# Patient Record
Sex: FEMALE | Race: WHITE | NOT HISPANIC OR LATINO | Employment: UNEMPLOYED | URBAN - METROPOLITAN AREA
[De-identification: names, ages, dates, MRNs, and addresses within clinical notes are randomized per-mention and may not be internally consistent; named-entity substitution may affect disease eponyms.]

---

## 2017-08-29 ENCOUNTER — HOSPITAL ENCOUNTER (OUTPATIENT)
Dept: RADIOLOGY | Facility: MEDICAL CENTER | Age: 62
End: 2017-08-29

## 2017-09-05 ENCOUNTER — HOSPITAL ENCOUNTER (OUTPATIENT)
Dept: LAB | Facility: MEDICAL CENTER | Age: 62
End: 2017-09-05
Attending: SURGERY
Payer: COMMERCIAL

## 2017-09-05 ENCOUNTER — HOSPITAL ENCOUNTER (OUTPATIENT)
Dept: RADIOLOGY | Facility: MEDICAL CENTER | Age: 62
End: 2017-09-05
Attending: SURGERY
Payer: COMMERCIAL

## 2017-09-05 DIAGNOSIS — C50.411 MALIGNANT NEOPLASM OF UPPER-OUTER QUADRANT OF RIGHT FEMALE BREAST (HCC): ICD-10-CM

## 2017-09-05 DIAGNOSIS — R92.0 BREAST MICROCALCIFICATIONS: ICD-10-CM

## 2017-09-05 LAB
BUN SERPL-MCNC: 15 MG/DL (ref 8–22)
CREAT SERPL-MCNC: 0.88 MG/DL (ref 0.5–1.4)
GFR SERPL CREATININE-BSD FRML MDRD: >60 ML/MIN/1.73 M 2

## 2017-09-05 PROCEDURE — 84520 ASSAY OF UREA NITROGEN: CPT

## 2017-09-05 PROCEDURE — 700117 HCHG RX CONTRAST REV CODE 255: Performed by: SURGERY

## 2017-09-05 PROCEDURE — A9579 GAD-BASE MR CONTRAST NOS,1ML: HCPCS | Performed by: SURGERY

## 2017-09-05 PROCEDURE — 36415 COLL VENOUS BLD VENIPUNCTURE: CPT

## 2017-09-05 PROCEDURE — C8908 MRI W/O FOL W/CONT, BREAST,: HCPCS

## 2017-09-05 PROCEDURE — 82565 ASSAY OF CREATININE: CPT

## 2017-09-05 RX ADMIN — GADODIAMIDE 15 ML: 287 INJECTION INTRAVENOUS at 17:51

## 2017-09-07 ENCOUNTER — TELEPHONE (OUTPATIENT)
Dept: RADIOLOGY | Facility: MEDICAL CENTER | Age: 62
End: 2017-09-07

## 2017-09-08 ENCOUNTER — TELEPHONE (OUTPATIENT)
Dept: RADIOLOGY | Facility: MEDICAL CENTER | Age: 62
End: 2017-09-08

## 2017-09-08 NOTE — TELEPHONE ENCOUNTER
09/08/17 - SP W/PT SAID WANTS TO DO US IN [facility].  SHE WILL ASK DOCTOR ERIN IF SHE CAN.  PT WILL CALL US BACK.

## 2017-09-19 ENCOUNTER — HOSPITAL ENCOUNTER (OUTPATIENT)
Dept: RADIOLOGY | Facility: MEDICAL CENTER | Age: 62
End: 2017-09-19
Attending: INTERNAL MEDICINE
Payer: COMMERCIAL

## 2017-09-19 ENCOUNTER — HOSPITAL ENCOUNTER (OUTPATIENT)
Dept: CARDIOLOGY | Facility: MEDICAL CENTER | Age: 62
End: 2017-09-19
Attending: INTERNAL MEDICINE
Payer: COMMERCIAL

## 2017-09-19 ENCOUNTER — HOSPITAL ENCOUNTER (OUTPATIENT)
Facility: MEDICAL CENTER | Age: 62
End: 2017-09-19
Attending: INTERNAL MEDICINE | Admitting: INTERNAL MEDICINE
Payer: COMMERCIAL

## 2017-09-19 VITALS
BODY MASS INDEX: 25.48 KG/M2 | OXYGEN SATURATION: 97 % | RESPIRATION RATE: 16 BRPM | DIASTOLIC BLOOD PRESSURE: 55 MMHG | WEIGHT: 172 LBS | SYSTOLIC BLOOD PRESSURE: 106 MMHG | HEIGHT: 69 IN | HEART RATE: 78 BPM

## 2017-09-19 DIAGNOSIS — C50.411 BREAST CANCER OF UPPER-OUTER QUADRANT OF RIGHT FEMALE BREAST (HCC): ICD-10-CM

## 2017-09-19 DIAGNOSIS — C50.411 MALIGNANT NEOPLASM OF UPPER-OUTER QUADRANT OF RIGHT FEMALE BREAST (HCC): ICD-10-CM

## 2017-09-19 LAB
LV EJECT FRACT  99904: 60
LV EJECT FRACT MOD 2C 99903: 48.42
LV EJECT FRACT MOD 4C 99902: 59.18
LV EJECT FRACT MOD BP 99901: 54.63

## 2017-09-19 PROCEDURE — 36561 INSERT TUNNELED CV CATH: CPT

## 2017-09-19 PROCEDURE — 93306 TTE W/DOPPLER COMPLETE: CPT | Mod: 26 | Performed by: INTERNAL MEDICINE

## 2017-09-19 PROCEDURE — 700101 HCHG RX REV CODE 250

## 2017-09-19 PROCEDURE — 700111 HCHG RX REV CODE 636 W/ 250 OVERRIDE (IP)

## 2017-09-19 PROCEDURE — 700111 HCHG RX REV CODE 636 W/ 250 OVERRIDE (IP): Performed by: RADIOLOGY

## 2017-09-19 PROCEDURE — 93306 TTE W/DOPPLER COMPLETE: CPT

## 2017-09-19 PROCEDURE — 700117 HCHG RX CONTRAST REV CODE 255: Performed by: INTERNAL MEDICINE

## 2017-09-19 PROCEDURE — 71260 CT THORAX DX C+: CPT

## 2017-09-19 RX ORDER — LIDOCAINE HYDROCHLORIDE 20 MG/ML
INJECTION, SOLUTION INFILTRATION; PERINEURAL
Status: COMPLETED
Start: 2017-09-19 | End: 2017-09-19

## 2017-09-19 RX ORDER — CEFAZOLIN SODIUM 1 G/3ML
INJECTION, POWDER, FOR SOLUTION INTRAMUSCULAR; INTRAVENOUS
Status: COMPLETED
Start: 2017-09-19 | End: 2017-09-19

## 2017-09-19 RX ORDER — EPINEPHRINE 1 MG/ML
INJECTION INTRAMUSCULAR; INTRAVENOUS; SUBCUTANEOUS
Status: COMPLETED
Start: 2017-09-19 | End: 2017-09-19

## 2017-09-19 RX ORDER — SODIUM CHLORIDE 9 MG/ML
500 INJECTION, SOLUTION INTRAVENOUS
Status: ACTIVE | OUTPATIENT
Start: 2017-09-19 | End: 2017-09-19

## 2017-09-19 RX ORDER — OXYCODONE HYDROCHLORIDE 5 MG/1
5 TABLET ORAL
Status: DISCONTINUED | OUTPATIENT
Start: 2017-09-19 | End: 2017-09-19 | Stop reason: HOSPADM

## 2017-09-19 RX ORDER — MIDAZOLAM HYDROCHLORIDE 1 MG/ML
.5-2 INJECTION INTRAMUSCULAR; INTRAVENOUS PRN
Status: ACTIVE | OUTPATIENT
Start: 2017-09-19 | End: 2017-09-19

## 2017-09-19 RX ORDER — ONDANSETRON 2 MG/ML
4 INJECTION INTRAMUSCULAR; INTRAVENOUS PRN
Status: ACTIVE | OUTPATIENT
Start: 2017-09-19 | End: 2017-09-19

## 2017-09-19 RX ORDER — SODIUM CHLORIDE 9 MG/ML
INJECTION, SOLUTION INTRAVENOUS
Status: DISCONTINUED | OUTPATIENT
Start: 2017-09-19 | End: 2017-09-19 | Stop reason: HOSPADM

## 2017-09-19 RX ORDER — OXYCODONE HYDROCHLORIDE 5 MG/1
2.5 TABLET ORAL
Status: DISCONTINUED | OUTPATIENT
Start: 2017-09-19 | End: 2017-09-19 | Stop reason: HOSPADM

## 2017-09-19 RX ADMIN — HEPARIN 500 UNITS: 100 SYRINGE at 09:13

## 2017-09-19 RX ADMIN — EPINEPHRINE 1 MG: 1 INJECTION INTRAMUSCULAR; INTRAVENOUS; SUBCUTANEOUS at 08:57

## 2017-09-19 RX ADMIN — IOHEXOL 100 ML: 350 INJECTION, SOLUTION INTRAVENOUS at 17:15

## 2017-09-19 RX ADMIN — LIDOCAINE HYDROCHLORIDE: 20 INJECTION, SOLUTION INFILTRATION; PERINEURAL at 08:00

## 2017-09-19 RX ADMIN — MIDAZOLAM HYDROCHLORIDE 1 MG: 1 INJECTION INTRAMUSCULAR; INTRAVENOUS at 08:48

## 2017-09-19 RX ADMIN — MIDAZOLAM HYDROCHLORIDE 2 MG: 1 INJECTION INTRAMUSCULAR; INTRAVENOUS at 08:38

## 2017-09-19 RX ADMIN — CEFAZOLIN 1000 MG: 1 INJECTION, POWDER, FOR SOLUTION INTRAVENOUS at 08:38

## 2017-09-19 ASSESSMENT — PAIN SCALES - GENERAL: PAINLEVEL_OUTOF10: 0

## 2017-09-19 NOTE — DISCHARGE INSTRUCTIONS
ACTIVITY: Rest and take it easy for the first 24 hours.  A responsible adult is recommended to remain with you during that time.  It is normal to feel sleepy.  We encourage you to not do anything that requires balance, judgment or coordination.    MILD FLU-LIKE SYMPTOMS ARE NORMAL. YOU MAY EXPERIENCE GENERALIZED MUSCLE ACHES, THROAT IRRITATION, HEADACHE AND/OR SOME NAUSEA.    FOR 24 HOURS DO NOT:  Drive, operate machinery or run household appliances.  Drink beer or alcoholic beverages.   Make important decisions or sign legal documents.    SPECIAL INSTRUCTIONS: keep dressing over surgical site until Thursday.      DIET: To avoid nausea, slowly advance diet as tolerated, avoiding spicy or greasy foods for the first day.  Add more substantial food to your diet according to your physician's instructions.  Babies can be fed formula or breast milk as soon as they are hungry.  INCREASE FLUIDS AND FIBER TO AVOID CONSTIPATION.    SURGICAL DRESSING/BATHING: Sponge bath on for 7 days, Do not get surgical area wet    FOLLOW-UP APPOINTMENT:  A follow-up appointment should be arranged with your doctor in 1 week; call to schedule.    You should CALL YOUR PHYSICIAN if you develop:  Fever greater than 101 degrees F.  Pain not relieved by medication, or persistent nausea or vomiting.  Excessive bleeding (blood soaking through dressing) or unexpected drainage from the wound.  Extreme redness or swelling around the incision site, drainage of pus or foul smelling drainage.  Inability to urinate or empty your bladder within 8 hours.  Problems with breathing or chest pain.    You should call 911 if you develop problems with breathing or chest pain.  If you are unable to contact your doctor or surgical center, you should go to the nearest emergency room or urgent care center.  Physician's telephone #: [phone]    If any questions arise, call your doctor.  If your doctor is not available, please feel free to call the Surgical Center at  [phone].  The Center is open Monday through Friday from 7AM to 7PM.  You can also call the HEALTH HOTLINE open 24 hours/day, 7 days/week and speak to a nurse at [phone], or toll free at [phone].    A registered nurse may call you a few days after your surgery to see how you are doing after your procedure.    MEDICATIONS: Resume taking daily medication.  Take prescribed pain medication with food.  If no medication is prescribed, you may take non-aspirin pain medication if needed.  PAIN MEDICATION CAN BE VERY CONSTIPATING.  Take a stool softener or laxative such as senokot, pericolace, or milk of magnesia if needed.    If your physician has prescribed pain medication that includes Acetaminophen (Tylenol), do not take additional Acetaminophen (Tylenol) while taking the prescribed medication.    Depression / Suicide Risk    As you are discharged from this [facility] facility, it is important to learn how to keep safe from harming yourself.    Recognize the warning signs:  · Abrupt changes in personality, positive or negative- including increase in energy   · Giving away possessions  · Change in eating patterns- significant weight changes-  positive or negative  · Change in sleeping patterns- unable to sleep or sleeping all the time   · Unwillingness or inability to communicate  · Depression  · Unusual sadness, discouragement and loneliness  · Talk of wanting to die  · Neglect of personal appearance   · Rebelliousness- reckless behavior  · Withdrawal from people/activities they love  · Confusion- inability to concentrate     If you or a loved one observes any of these behaviors or has concerns about self-harm, here's what you can do:  · Talk about it- your feelings and reasons for harming yourself  · Remove any means that you might use to hurt yourself (examples: pills, rope, extension cords, firearm)  · Get professional help from the community (Mental Health, Substance Abuse, psychological  counseling)  · Do not be alone:Call your Safe Contact- someone whom you trust who will be there for you.  · Call your local CRISIS HOTLINE 222-7388 or 942-186-7918  · Call your local Children's Mobile Crisis Response Team [place] (056) 974-2181 or www.[employer]  · Call the toll free National Suicide Prevention Hotlines   · National Suicide Prevention Lifeline 093-245-XLHM (8287)  · National Hope Line Network 800-SUICIDE (129-2705)  Implanted Port Insertion, Care After  Refer to this sheet in the next few weeks. These instructions provide you with information on caring for yourself after your procedure. Your health care provider may also give you more specific instructions. Your treatment has been planned according to current medical practices, but problems sometimes occur. Call your health care provider if you have any problems or questions after your procedure.  WHAT TO EXPECT AFTER THE PROCEDURE  After your procedure, it is typical to have the following:   Discomfort at the port insertion site. Ice packs to the area will help.  Bruising on the skin over the port. This will subside in 3-4 days.  HOME CARE INSTRUCTIONS  After your port is placed, you will get a 's information card. The card has information about your port. Keep this card with you at all times.    Know what kind of port you have. There are many types of ports available.    Wear a medical alert bracelet in case of an emergency. This can help alert health care workers that you have a port.    The port can stay in for as long as your health care provider believes it is necessary.    A home health care nurse may give medicines and take care of the port.    You or a family member can get special training and directions for giving medicine and taking care of the port at home.    SEEK MEDICAL CARE IF:   Your port does not flush or you are unable to get a blood return.    You have a fever or chills.  SEEK IMMEDIATE MEDICAL CARE  IF:  You have new fluid or pus coming from your incision.    You notice a bad smell coming from your incision site.    You have swelling, pain, or more redness at the incision or port site.    You have chest pain or shortness of breath.     This information is not intended to replace advice given to you by your health care provider. Make sure you discuss any questions you have with your health care provider.     Document Released: 10/08/2014 Document Revised: 12/23/2014 Document Reviewed: 10/08/2014  Elsevier Interactive Patient Education ©2016 Elsevier Inc.  ·

## 2017-09-19 NOTE — OR NURSING
Discharge instructions reviewed with patient and .  IV removed, tip intact.  Patient ambulated out with .

## 2017-09-19 NOTE — PROGRESS NOTES
OPIR Note    Patient underwent a port placement right IJ by Dr. Frias. Pt remained hemodynamically stable during procedure. Report given to Stan at bedside RN.  Port was heplocked with heparin.    [employer] powerport Ref # [id number] Lort # [id number]

## 2017-09-19 NOTE — OR SURGEON
Immediate Post- Operative Note        PostOp Diagnosis: Needs port for chemo      Procedure(s): Right IJ port placement      Estimated Blood Loss: Less than 5 ml        Complications: None            9/19/2017     9:08 AM     Renan Frias

## 2017-09-20 ENCOUNTER — HOSPITAL ENCOUNTER (OUTPATIENT)
Dept: RADIOLOGY | Facility: MEDICAL CENTER | Age: 62
End: 2017-09-20
Attending: INTERNAL MEDICINE
Payer: COMMERCIAL

## 2017-09-20 DIAGNOSIS — C50.411 MALIGNANT NEOPLASM OF UPPER-OUTER QUADRANT OF RIGHT FEMALE BREAST (HCC): ICD-10-CM

## 2017-09-20 PROCEDURE — A9503 TC99M MEDRONATE: HCPCS

## 2018-03-08 DIAGNOSIS — Z01.810 PRE-OPERATIVE CARDIOVASCULAR EXAMINATION: ICD-10-CM

## 2018-03-08 DIAGNOSIS — Z01.812 PRE-OPERATIVE LABORATORY EXAMINATION: ICD-10-CM

## 2018-03-08 LAB
ALBUMIN SERPL BCP-MCNC: 4 G/DL (ref 3.2–4.9)
ALBUMIN/GLOB SERPL: 1.5 G/DL
ALP SERPL-CCNC: 62 U/L (ref 30–99)
ALT SERPL-CCNC: 12 U/L (ref 2–50)
ANION GAP SERPL CALC-SCNC: 6 MMOL/L (ref 0–11.9)
AST SERPL-CCNC: 14 U/L (ref 12–45)
BASOPHILS # BLD AUTO: 0.6 % (ref 0–1.8)
BASOPHILS # BLD: 0.05 K/UL (ref 0–0.12)
BILIRUB SERPL-MCNC: 0.3 MG/DL (ref 0.1–1.5)
BUN SERPL-MCNC: 19 MG/DL (ref 8–22)
CALCIUM SERPL-MCNC: 9.4 MG/DL (ref 8.5–10.5)
CHLORIDE SERPL-SCNC: 108 MMOL/L (ref 96–112)
CO2 SERPL-SCNC: 26 MMOL/L (ref 20–33)
CREAT SERPL-MCNC: 0.88 MG/DL (ref 0.5–1.4)
EKG IMPRESSION: NORMAL
EOSINOPHIL # BLD AUTO: 0.07 K/UL (ref 0–0.51)
EOSINOPHIL NFR BLD: 0.9 % (ref 0–6.9)
ERYTHROCYTE [DISTWIDTH] IN BLOOD BY AUTOMATED COUNT: 41.3 FL (ref 35.9–50)
GLOBULIN SER CALC-MCNC: 2.6 G/DL (ref 1.9–3.5)
GLUCOSE SERPL-MCNC: 96 MG/DL (ref 65–99)
HCT VFR BLD AUTO: 39.8 % (ref 37–47)
HGB BLD-MCNC: 13.2 G/DL (ref 12–16)
IMM GRANULOCYTES # BLD AUTO: 0.04 K/UL (ref 0–0.11)
IMM GRANULOCYTES NFR BLD AUTO: 0.5 % (ref 0–0.9)
INR PPP: 1.08 (ref 0.87–1.13)
LYMPHOCYTES # BLD AUTO: 1.13 K/UL (ref 1–4.8)
LYMPHOCYTES NFR BLD: 14.3 % (ref 22–41)
MCH RBC QN AUTO: 31.1 PG (ref 27–33)
MCHC RBC AUTO-ENTMCNC: 33.2 G/DL (ref 33.6–35)
MCV RBC AUTO: 93.6 FL (ref 81.4–97.8)
MONOCYTES # BLD AUTO: 0.45 K/UL (ref 0–0.85)
MONOCYTES NFR BLD AUTO: 5.7 % (ref 0–13.4)
NEUTROPHILS # BLD AUTO: 6.16 K/UL (ref 2–7.15)
NEUTROPHILS NFR BLD: 78 % (ref 44–72)
NRBC # BLD AUTO: 0 K/UL
NRBC BLD-RTO: 0 /100 WBC
PLATELET # BLD AUTO: 238 K/UL (ref 164–446)
PMV BLD AUTO: 9.9 FL (ref 9–12.9)
POTASSIUM SERPL-SCNC: 3.9 MMOL/L (ref 3.6–5.5)
PROT SERPL-MCNC: 6.6 G/DL (ref 6–8.2)
PROTHROMBIN TIME: 13.7 SEC (ref 12–14.6)
RBC # BLD AUTO: 4.25 M/UL (ref 4.2–5.4)
SODIUM SERPL-SCNC: 140 MMOL/L (ref 135–145)
WBC # BLD AUTO: 7.9 K/UL (ref 4.8–10.8)

## 2018-03-08 PROCEDURE — 93005 ELECTROCARDIOGRAM TRACING: CPT

## 2018-03-08 PROCEDURE — 85610 PROTHROMBIN TIME: CPT

## 2018-03-08 PROCEDURE — 93010 ELECTROCARDIOGRAM REPORT: CPT | Performed by: INTERNAL MEDICINE

## 2018-03-08 PROCEDURE — 36415 COLL VENOUS BLD VENIPUNCTURE: CPT

## 2018-03-08 PROCEDURE — 80053 COMPREHEN METABOLIC PANEL: CPT

## 2018-03-08 PROCEDURE — 85025 COMPLETE CBC W/AUTO DIFF WBC: CPT

## 2018-03-10 ENCOUNTER — HOSPITAL ENCOUNTER (OUTPATIENT)
Facility: MEDICAL CENTER | Age: 63
End: 2018-03-11
Attending: PLASTIC SURGERY | Admitting: PLASTIC SURGERY
Payer: COMMERCIAL

## 2018-03-10 PROCEDURE — 88307 TISSUE EXAM BY PATHOLOGIST: CPT

## 2018-03-10 PROCEDURE — 502240 HCHG MISC OR SUPPLY RC 0272: Performed by: PLASTIC SURGERY

## 2018-03-10 PROCEDURE — 160035 HCHG PACU - 1ST 60 MINS PHASE I: Performed by: PLASTIC SURGERY

## 2018-03-10 PROCEDURE — 500368 HCHG DRAIN, 7MM FLAT-FLUTED: Performed by: PLASTIC SURGERY

## 2018-03-10 PROCEDURE — 160009 HCHG ANES TIME/MIN: Performed by: PLASTIC SURGERY

## 2018-03-10 PROCEDURE — 96374 THER/PROPH/DIAG INJ IV PUSH: CPT

## 2018-03-10 PROCEDURE — 88309 TISSUE EXAM BY PATHOLOGIST: CPT

## 2018-03-10 PROCEDURE — A6402 STERILE GAUZE <= 16 SQ IN: HCPCS | Performed by: PLASTIC SURGERY

## 2018-03-10 PROCEDURE — 160036 HCHG PACU - EA ADDL 30 MINS PHASE I: Performed by: PLASTIC SURGERY

## 2018-03-10 PROCEDURE — A9270 NON-COVERED ITEM OR SERVICE: HCPCS | Performed by: SURGERY

## 2018-03-10 PROCEDURE — A9270 NON-COVERED ITEM OR SERVICE: HCPCS

## 2018-03-10 PROCEDURE — G0378 HOSPITAL OBSERVATION PER HR: HCPCS

## 2018-03-10 PROCEDURE — 700112 HCHG RX REV CODE 229: Performed by: SURGERY

## 2018-03-10 PROCEDURE — 110372 HCHG SHELL REV 278: Performed by: PLASTIC SURGERY

## 2018-03-10 PROCEDURE — 700111 HCHG RX REV CODE 636 W/ 250 OVERRIDE (IP)

## 2018-03-10 PROCEDURE — 700101 HCHG RX REV CODE 250

## 2018-03-10 PROCEDURE — 502000 HCHG MISC OR IMPLANTS RC 0278: Performed by: PLASTIC SURGERY

## 2018-03-10 PROCEDURE — 500423 HCHG DRESSING, ABD COMBINE: Performed by: PLASTIC SURGERY

## 2018-03-10 PROCEDURE — 700101 HCHG RX REV CODE 250: Performed by: SURGERY

## 2018-03-10 PROCEDURE — 501497 HCHG SURGICLIP: Performed by: PLASTIC SURGERY

## 2018-03-10 PROCEDURE — 700111 HCHG RX REV CODE 636 W/ 250 OVERRIDE (IP): Performed by: SURGERY

## 2018-03-10 PROCEDURE — 160029 HCHG SURGERY MINUTES - 1ST 30 MINS LEVEL 4: Performed by: PLASTIC SURGERY

## 2018-03-10 PROCEDURE — 500438 HCHG DRESSING, SPANDAGE #10 12: Performed by: PLASTIC SURGERY

## 2018-03-10 PROCEDURE — 502703 HCHG DEVICE, LIGASURE V SEALER: Performed by: PLASTIC SURGERY

## 2018-03-10 PROCEDURE — 500378 HCHG DRAIN, J-VAC ROUND 19FR: Performed by: PLASTIC SURGERY

## 2018-03-10 PROCEDURE — 500122 HCHG BOVIE, BLADE: Performed by: PLASTIC SURGERY

## 2018-03-10 PROCEDURE — 500126 HCHG BOVIE, NEEDLE TIP: Performed by: PLASTIC SURGERY

## 2018-03-10 PROCEDURE — 160048 HCHG OR STATISTICAL LEVEL 1-5: Performed by: PLASTIC SURGERY

## 2018-03-10 PROCEDURE — 501838 HCHG SUTURE GENERAL: Performed by: PLASTIC SURGERY

## 2018-03-10 PROCEDURE — 501445 HCHG STAPLER, SKIN DISP: Performed by: PLASTIC SURGERY

## 2018-03-10 PROCEDURE — A6403 STERILE GAUZE>16 <= 48 SQ IN: HCPCS | Performed by: PLASTIC SURGERY

## 2018-03-10 PROCEDURE — 500389 HCHG DRAIN, RESERVOIR SUCT JP 100CC: Performed by: PLASTIC SURGERY

## 2018-03-10 PROCEDURE — 500054 HCHG BANDAGE, ELASTIC 6: Performed by: PLASTIC SURGERY

## 2018-03-10 PROCEDURE — 160041 HCHG SURGERY MINUTES - EA ADDL 1 MIN LEVEL 4: Performed by: PLASTIC SURGERY

## 2018-03-10 PROCEDURE — 700102 HCHG RX REV CODE 250 W/ 637 OVERRIDE(OP): Performed by: SURGERY

## 2018-03-10 PROCEDURE — 160002 HCHG RECOVERY MINUTES (STAT): Performed by: PLASTIC SURGERY

## 2018-03-10 PROCEDURE — 700102 HCHG RX REV CODE 250 W/ 637 OVERRIDE(OP)

## 2018-03-10 DEVICE — IMPLANTABLE DEVICE: Type: IMPLANTABLE DEVICE | Site: BREAST | Status: FUNCTIONAL

## 2018-03-10 RX ORDER — LIDOCAINE AND PRILOCAINE 25; 25 MG/G; MG/G
1 CREAM TOPICAL
Status: ACTIVE | OUTPATIENT
Start: 2018-03-10 | End: 2018-03-11

## 2018-03-10 RX ORDER — LABETALOL HYDROCHLORIDE 5 MG/ML
10 INJECTION, SOLUTION INTRAVENOUS EVERY 4 HOURS PRN
Status: DISCONTINUED | OUTPATIENT
Start: 2018-03-10 | End: 2018-03-11 | Stop reason: HOSPADM

## 2018-03-10 RX ORDER — DOCUSATE SODIUM 100 MG/1
100 CAPSULE, LIQUID FILLED ORAL 2 TIMES DAILY
Status: DISCONTINUED | OUTPATIENT
Start: 2018-03-10 | End: 2018-03-11 | Stop reason: HOSPADM

## 2018-03-10 RX ORDER — LORAZEPAM 2 MG/ML
0.5 INJECTION INTRAMUSCULAR EVERY 4 HOURS PRN
Status: DISCONTINUED | OUTPATIENT
Start: 2018-03-10 | End: 2018-03-11 | Stop reason: HOSPADM

## 2018-03-10 RX ORDER — SODIUM CHLORIDE, SODIUM LACTATE, POTASSIUM CHLORIDE, CALCIUM CHLORIDE 600; 310; 30; 20 MG/100ML; MG/100ML; MG/100ML; MG/100ML
INJECTION, SOLUTION INTRAVENOUS CONTINUOUS
Status: DISCONTINUED | OUTPATIENT
Start: 2018-03-10 | End: 2018-03-11 | Stop reason: HOSPADM

## 2018-03-10 RX ORDER — BISACODYL 10 MG
10 SUPPOSITORY, RECTAL RECTAL
Status: DISCONTINUED | OUTPATIENT
Start: 2018-03-10 | End: 2018-03-11 | Stop reason: HOSPADM

## 2018-03-10 RX ORDER — ENEMA 19; 7 G/133ML; G/133ML
1 ENEMA RECTAL
Status: DISCONTINUED | OUTPATIENT
Start: 2018-03-10 | End: 2018-03-11 | Stop reason: HOSPADM

## 2018-03-10 RX ORDER — LIDOCAINE HYDROCHLORIDE 10 MG/ML
INJECTION, SOLUTION INFILTRATION; PERINEURAL
Status: COMPLETED
Start: 2018-03-10 | End: 2018-03-10

## 2018-03-10 RX ORDER — MIDAZOLAM HYDROCHLORIDE 1 MG/ML
INJECTION INTRAMUSCULAR; INTRAVENOUS
Status: COMPLETED
Start: 2018-03-10 | End: 2018-03-10

## 2018-03-10 RX ORDER — BUPIVACAINE HYDROCHLORIDE AND EPINEPHRINE 5; 5 MG/ML; UG/ML
INJECTION, SOLUTION EPIDURAL; INTRACAUDAL; PERINEURAL
Status: DISCONTINUED | OUTPATIENT
Start: 2018-03-10 | End: 2018-03-10 | Stop reason: HOSPADM

## 2018-03-10 RX ORDER — LIDOCAINE HYDROCHLORIDE 10 MG/ML
0.5 INJECTION, SOLUTION INFILTRATION; PERINEURAL
Status: ACTIVE | OUTPATIENT
Start: 2018-03-10 | End: 2018-03-11

## 2018-03-10 RX ORDER — ACETAMINOPHEN 650 MG/1
650 SUPPOSITORY RECTAL EVERY 4 HOURS PRN
Status: DISCONTINUED | OUTPATIENT
Start: 2018-03-10 | End: 2018-03-11 | Stop reason: HOSPADM

## 2018-03-10 RX ORDER — AMOXICILLIN 250 MG
1 CAPSULE ORAL NIGHTLY
Status: DISCONTINUED | OUTPATIENT
Start: 2018-03-10 | End: 2018-03-11 | Stop reason: HOSPADM

## 2018-03-10 RX ORDER — MORPHINE SULFATE 4 MG/ML
1-4 INJECTION, SOLUTION INTRAMUSCULAR; INTRAVENOUS
Status: DISCONTINUED | OUTPATIENT
Start: 2018-03-10 | End: 2018-03-11 | Stop reason: HOSPADM

## 2018-03-10 RX ORDER — OXYCODONE HCL 5 MG/5 ML
SOLUTION, ORAL ORAL
Status: COMPLETED
Start: 2018-03-10 | End: 2018-03-10

## 2018-03-10 RX ORDER — AMOXICILLIN 250 MG
1 CAPSULE ORAL
Status: DISCONTINUED | OUTPATIENT
Start: 2018-03-10 | End: 2018-03-11 | Stop reason: HOSPADM

## 2018-03-10 RX ORDER — ACETAMINOPHEN 325 MG/1
650 TABLET ORAL EVERY 4 HOURS PRN
Status: DISCONTINUED | OUTPATIENT
Start: 2018-03-10 | End: 2018-03-11 | Stop reason: HOSPADM

## 2018-03-10 RX ORDER — MORPHINE SULFATE 4 MG/ML
1-5 INJECTION, SOLUTION INTRAMUSCULAR; INTRAVENOUS
Status: DISCONTINUED | OUTPATIENT
Start: 2018-03-10 | End: 2018-03-10

## 2018-03-10 RX ORDER — OXYCODONE HYDROCHLORIDE 5 MG/1
5 TABLET ORAL
Status: DISCONTINUED | OUTPATIENT
Start: 2018-03-10 | End: 2018-03-11 | Stop reason: HOSPADM

## 2018-03-10 RX ORDER — ACETAMINOPHEN 650 MG
TABLET, EXTENDED RELEASE ORAL
Status: DISCONTINUED | OUTPATIENT
Start: 2018-03-10 | End: 2018-03-10 | Stop reason: HOSPADM

## 2018-03-10 RX ORDER — DEXTROSE MONOHYDRATE, SODIUM CHLORIDE, AND POTASSIUM CHLORIDE 50; 1.49; 4.5 G/1000ML; G/1000ML; G/1000ML
INJECTION, SOLUTION INTRAVENOUS CONTINUOUS
Status: DISCONTINUED | OUTPATIENT
Start: 2018-03-10 | End: 2018-03-11 | Stop reason: HOSPADM

## 2018-03-10 RX ORDER — CEFAZOLIN SODIUM 2 G/100ML
2 INJECTION, SOLUTION INTRAVENOUS EVERY 8 HOURS
Status: DISCONTINUED | OUTPATIENT
Start: 2018-03-10 | End: 2018-03-11 | Stop reason: HOSPADM

## 2018-03-10 RX ORDER — MORPHINE SULFATE 10 MG/ML
5 INJECTION, SOLUTION INTRAMUSCULAR; INTRAVENOUS
Status: DISCONTINUED | OUTPATIENT
Start: 2018-03-10 | End: 2018-03-11 | Stop reason: HOSPADM

## 2018-03-10 RX ORDER — FAMOTIDINE 20 MG/1
20 TABLET, FILM COATED ORAL 2 TIMES DAILY
Status: DISCONTINUED | OUTPATIENT
Start: 2018-03-10 | End: 2018-03-11 | Stop reason: HOSPADM

## 2018-03-10 RX ORDER — ONDANSETRON 2 MG/ML
4 INJECTION INTRAMUSCULAR; INTRAVENOUS EVERY 4 HOURS PRN
Status: DISCONTINUED | OUTPATIENT
Start: 2018-03-10 | End: 2018-03-11 | Stop reason: HOSPADM

## 2018-03-10 RX ORDER — POLYETHYLENE GLYCOL 3350 17 G/17G
1 POWDER, FOR SOLUTION ORAL 2 TIMES DAILY
Status: DISCONTINUED | OUTPATIENT
Start: 2018-03-10 | End: 2018-03-11 | Stop reason: HOSPADM

## 2018-03-10 RX ADMIN — CEFAZOLIN SODIUM 2 G: 2 INJECTION, SOLUTION INTRAVENOUS at 21:24

## 2018-03-10 RX ADMIN — SODIUM CHLORIDE, POTASSIUM CHLORIDE, SODIUM LACTATE AND CALCIUM CHLORIDE: 600; 310; 30; 20 INJECTION, SOLUTION INTRAVENOUS at 12:08

## 2018-03-10 RX ADMIN — HYDROMORPHONE HYDROCHLORIDE 0.3 MG: 10 INJECTION, SOLUTION INTRAMUSCULAR; INTRAVENOUS; SUBCUTANEOUS at 16:40

## 2018-03-10 RX ADMIN — FENTANYL CITRATE 50 MCG: 50 INJECTION, SOLUTION INTRAMUSCULAR; INTRAVENOUS at 15:34

## 2018-03-10 RX ADMIN — LIDOCAINE HYDROCHLORIDE: 10 INJECTION, SOLUTION INFILTRATION; PERINEURAL at 12:08

## 2018-03-10 RX ADMIN — STANDARDIZED SENNA CONCENTRATE AND DOCUSATE SODIUM 1 TABLET: 8.6; 5 TABLET, FILM COATED ORAL at 21:15

## 2018-03-10 RX ADMIN — MAGNESIUM HYDROXIDE 30 ML: 400 SUSPENSION ORAL at 21:17

## 2018-03-10 RX ADMIN — ACETAMINOPHEN 650 MG: 325 TABLET, FILM COATED ORAL at 21:23

## 2018-03-10 RX ADMIN — POTASSIUM CHLORIDE, DEXTROSE MONOHYDRATE AND SODIUM CHLORIDE: 150; 5; 450 INJECTION, SOLUTION INTRAVENOUS at 18:48

## 2018-03-10 RX ADMIN — POLYETHYLENE GLYCOL 3350 1 PACKET: 17 POWDER, FOR SOLUTION ORAL at 21:16

## 2018-03-10 RX ADMIN — OXYCODONE HYDROCHLORIDE 10 MG: 5 SOLUTION ORAL at 15:34

## 2018-03-10 RX ADMIN — FAMOTIDINE 20 MG: 20 TABLET, FILM COATED ORAL at 21:16

## 2018-03-10 RX ADMIN — OXYCODONE HYDROCHLORIDE 5 MG: 5 TABLET ORAL at 18:47

## 2018-03-10 RX ADMIN — FENTANYL CITRATE 50 MCG: 50 INJECTION, SOLUTION INTRAMUSCULAR; INTRAVENOUS at 15:52

## 2018-03-10 RX ADMIN — MIDAZOLAM 0.5 MG: 1 INJECTION INTRAMUSCULAR; INTRAVENOUS at 15:45

## 2018-03-10 RX ADMIN — DOCUSATE SODIUM 100 MG: 100 CAPSULE ORAL at 18:48

## 2018-03-10 ASSESSMENT — PAIN SCALES - GENERAL
PAINLEVEL_OUTOF10: 8
PAINLEVEL_OUTOF10: 4
PAINLEVEL_OUTOF10: 6
PAINLEVEL_OUTOF10: 6
PAINLEVEL_OUTOF10: 5
PAINLEVEL_OUTOF10: 7
PAINLEVEL_OUTOF10: 0
PAINLEVEL_OUTOF10: 7
PAINLEVEL_OUTOF10: 2
PAINLEVEL_OUTOF10: 6
PAINLEVEL_OUTOF10: 3

## 2018-03-10 ASSESSMENT — LIFESTYLE VARIABLES
HAVE YOU EVER FELT YOU SHOULD CUT DOWN ON YOUR DRINKING: NO
ON A TYPICAL DAY WHEN YOU DRINK ALCOHOL HOW MANY DRINKS DO YOU HAVE: 1
CONSUMPTION TOTAL: NEGATIVE
TOTAL SCORE: 0
EVER FELT BAD OR GUILTY ABOUT YOUR DRINKING: NO
HOW MANY TIMES IN THE PAST YEAR HAVE YOU HAD 5 OR MORE DRINKS IN A DAY: 0
PACK_YEARS: 25
TOTAL SCORE: 0
EVER HAD A DRINK FIRST THING IN THE MORNING TO STEADY YOUR NERVES TO GET RID OF A HANGOVER: NO
TOTAL SCORE: 0
DO YOU DRINK ALCOHOL: YES
HAVE PEOPLE ANNOYED YOU BY CRITICIZING YOUR DRINKING: NO
AVERAGE NUMBER OF DAYS PER WEEK YOU HAVE A DRINK CONTAINING ALCOHOL: 2
EVER_SMOKED: YES

## 2018-03-10 ASSESSMENT — PATIENT HEALTH QUESTIONNAIRE - PHQ9
SUM OF ALL RESPONSES TO PHQ QUESTIONS 1-9: 0
1. LITTLE INTEREST OR PLEASURE IN DOING THINGS: NOT AT ALL
SUM OF ALL RESPONSES TO PHQ9 QUESTIONS 1 AND 2: 0
2. FEELING DOWN, DEPRESSED, IRRITABLE, OR HOPELESS: NOT AT ALL

## 2018-03-10 NOTE — OR SURGEON
Immediate Post OP Note    PreOp Diagnosis: Right Breast Cancer s/p neoadjuvant therapy    PostOp Diagnosis: same    Procedure(s):  BREAST RECONSTRUCTION - Wound Class: Clean  TISSUE EXPANDER PLACE/REMOVE- & USE OF ACELLULAR DERMAL MATRIX - Wound Class: Clean  Right MASTECTOMY-TOTAL with limited node dissection - Wound Class: Clean  NODE BIOPSY SENTINEL- AXILLARY - Wound Class: Clean   Left total mastectomy    Surgeon(s):  NEMO Brannon M.D.    Anesthesiologist/Type of Anesthesia:  Anesthesiologist: Munir Choudhary M.D./General    Surgical Staff:  Assistant: Kamila Allen  Circulator: Diogenes Resendez R.N.  Relief Circulator: Katlyn Harry R.N.  Scrub Person: Scotty Valles    Specimens:  * No specimens in log *    Estimated Blood Loss: 50 cc's    Findings: no gross pathology in breast, sentinel nodes were firm and there were some enlarged nodes so limited node dissection performed.     Complications: no apparent complicatons        3/10/2018 2:58 PM Jung Meadows

## 2018-03-10 NOTE — OP REPORT
DATE OF OPERATION: 3/10/2018    PREOPERATIVE DIAGNOSIS: right breast cancer    POSTOPERATIVE DIAGNOSIS: Same    PROCEDURE PERFORMED: Bilateral tissue expanders with use of acellular dermal matrix.    SURGEON:German Shetty M.D., MD    ASSISTANT: Kamila TARIQ     ANESTHESIOLOGIST:  Jakub Choudhary MD    ANESTHESIA: general    INDICATIONS: The patient is a 62 y.o. female with right breast cancer who has elected to have bilateral mastectomies and immediate reconstruction with placement of tissue expanders and use of acellular dermal matrix.      FINDINGS:  375 Tissue expanders were placed bilaterally.  240 ml was placed in the right tissue expander and 240 ml was placed in the left tissue expander.    SPECIMEN: None for my portion of the operation    ESTIMATED BLOOD LOSS: Minimal    PROCEDURE: After the operative, nonoperative, and alternatives were discussed which included but was not limited to bleeding, infection, damage to surrounding structures, need for further surgery, reaction in the anesthetic agent, scarring, mastectomy skin flap necrosis, wound healing difficulties, need for tissue expander removal, tissue expander rupture, failed breast reconstruction, asymmetry, contour irregularities, deep venous thrombosis, pulmonary embolus, myocardial infarction, stroke, unsatisfactory result, and/or death informed consent was obtained. Preoperative the patient was identified and in a sitting upright position the patient's sternal notch, midline, and inframammary folds were marked. The plan mastectomy incision lines were drawn out in a horizontal fashion. Antibiotics were given and sequential compression devices were placed. Patient was brought back to the operating room where general anesthesia was induced. Please see the operative note from the general surgeon for the mastectomy portion of the operation. Following completion of this starting on the left side bovie electrocautery was used to dissect down along  the lateral border of the pectoralis major muscle. A plane was then developed between the pectoralis major and the pectoralis minor to about 2 cm off the midline, superiorly the pocket was developed, inferiorly the dissection was then carried down to the inframammary fold. The muscle was then released and the lateral to medial direction along its origin. The chest wall was measured and marked out. Acellular dermal matrix was then sewn into the inframammary fold and lateral chest wall with interrupted 2-0 Vicryl sutures. Intercostal blocks were placed for postoperative pain control. 2 #7 flat drains were placed and secured with 3-0 nylon sutures. The pocket was then copiously irrigated with a triple antibiotic irrigation. Then using new gloves, Betadine on the skin, and a minimal touch technique a 375 ml  tissue expander was placed. The acellular dermal matrix was then approximated to the pectoralis major muscle with running and interrupted 2-0 Vicryl sutures. The tissue expander was then filled with 240 ml of solution. The overlying skin was then closed with 3-0 deep dermal Monocryl sutures and running 4-0 Monocryl subcuticular stitches.     We then turned our attention to the contralateral side with the same procedure was performed. The same size tissue expander was placed.  240 ml of solution was placed in this tissue expander. The wound was then closed in similar fashion. Steri-Strips and compressive dressings were then placed.    The patient tolerated the procedure well. At the end of the procedure all sponge, instruments, and needle counts were correct. There were no apparent complications.    Cc: German Shetty M.D., Jung Meadows MD  ____________________________________     German Shetty M.D.  , MD    DT: 3/10/2018  3:17 PM

## 2018-03-11 VITALS
HEIGHT: 69 IN | WEIGHT: 161.6 LBS | TEMPERATURE: 98.2 F | BODY MASS INDEX: 23.93 KG/M2 | DIASTOLIC BLOOD PRESSURE: 71 MMHG | RESPIRATION RATE: 17 BRPM | OXYGEN SATURATION: 99 % | SYSTOLIC BLOOD PRESSURE: 140 MMHG | HEART RATE: 62 BPM

## 2018-03-11 PROCEDURE — 700111 HCHG RX REV CODE 636 W/ 250 OVERRIDE (IP): Performed by: SURGERY

## 2018-03-11 PROCEDURE — 90670 PCV13 VACCINE IM: CPT | Performed by: PLASTIC SURGERY

## 2018-03-11 PROCEDURE — G0378 HOSPITAL OBSERVATION PER HR: HCPCS

## 2018-03-11 PROCEDURE — 90471 IMMUNIZATION ADMIN: CPT

## 2018-03-11 PROCEDURE — A9270 NON-COVERED ITEM OR SERVICE: HCPCS | Performed by: SURGERY

## 2018-03-11 PROCEDURE — 90686 IIV4 VACC NO PRSV 0.5 ML IM: CPT | Performed by: PLASTIC SURGERY

## 2018-03-11 PROCEDURE — 96375 TX/PRO/DX INJ NEW DRUG ADDON: CPT

## 2018-03-11 PROCEDURE — 96376 TX/PRO/DX INJ SAME DRUG ADON: CPT

## 2018-03-11 PROCEDURE — 700105 HCHG RX REV CODE 258: Performed by: PLASTIC SURGERY

## 2018-03-11 PROCEDURE — 700111 HCHG RX REV CODE 636 W/ 250 OVERRIDE (IP): Performed by: PLASTIC SURGERY

## 2018-03-11 PROCEDURE — 700102 HCHG RX REV CODE 250 W/ 637 OVERRIDE(OP): Performed by: SURGERY

## 2018-03-11 PROCEDURE — 700112 HCHG RX REV CODE 229: Performed by: SURGERY

## 2018-03-11 RX ADMIN — PNEUMOCOCCAL 13-VALENT CONJUGATE VACCINE 0.5 ML: 2.2; 2.2; 2.2; 2.2; 2.2; 4.4; 2.2; 2.2; 2.2; 2.2; 2.2; 2.2; 2.2 INJECTION, SUSPENSION INTRAMUSCULAR at 12:12

## 2018-03-11 RX ADMIN — POLYETHYLENE GLYCOL 3350 1 PACKET: 17 POWDER, FOR SOLUTION ORAL at 09:00

## 2018-03-11 RX ADMIN — OXYCODONE HYDROCHLORIDE 5 MG: 5 TABLET ORAL at 08:38

## 2018-03-11 RX ADMIN — SODIUM CHLORIDE, POTASSIUM CHLORIDE, SODIUM LACTATE AND CALCIUM CHLORIDE: 600; 310; 30; 20 INJECTION, SOLUTION INTRAVENOUS at 05:00

## 2018-03-11 RX ADMIN — FAMOTIDINE 20 MG: 20 TABLET, FILM COATED ORAL at 08:38

## 2018-03-11 RX ADMIN — CEFAZOLIN SODIUM 2 G: 2 INJECTION, SOLUTION INTRAVENOUS at 05:00

## 2018-03-11 RX ADMIN — INFLUENZA A VIRUS A/MICHIGAN/45/2015 X-275 (H1N1) ANTIGEN (FORMALDEHYDE INACTIVATED), INFLUENZA A VIRUS A/HONG KONG/4801/2014 X-263B (H3N2) ANTIGEN (FORMALDEHYDE INACTIVATED), INFLUENZA B VIRUS B/PHUKET/3073/2013 ANTIGEN (FORMALDEHYDE INACTIVATED), AND INFLUENZA B VIRUS B/BRISBANE/60/2008 ANTIGEN (FORMALDEHYDE INACTIVATED) 0.5 ML: 15; 15; 15; 15 INJECTION, SUSPENSION INTRAMUSCULAR at 08:38

## 2018-03-11 RX ADMIN — OXYCODONE HYDROCHLORIDE 5 MG: 5 TABLET ORAL at 00:18

## 2018-03-11 RX ADMIN — DOCUSATE SODIUM 100 MG: 100 CAPSULE ORAL at 08:38

## 2018-03-11 RX ADMIN — ONDANSETRON HYDROCHLORIDE 4 MG: 2 INJECTION, SOLUTION INTRAMUSCULAR; INTRAVENOUS at 05:07

## 2018-03-11 ASSESSMENT — PAIN SCALES - GENERAL
PAINLEVEL_OUTOF10: 3
PAINLEVEL_OUTOF10: 3
PAINLEVEL_OUTOF10: 5
PAINLEVEL_OUTOF10: 6

## 2018-03-11 ASSESSMENT — ENCOUNTER SYMPTOMS
COUGH: 0
VOMITING: 0
CHILLS: 0
FEVER: 0
NAUSEA: 0

## 2018-03-11 NOTE — PROGRESS NOTES
"Trauma / Surgical Progress Note  Interval Events:  24 hour interval history reviewed.  Patient is doing well with pain well managed.  DON drainage has been moderate.  One episode of N/V after surgery but better now.       Review of Systems   Constitutional: Negative for chills and fever.   Respiratory: Negative for cough.    Gastrointestinal: Negative for nausea and vomiting.       Vitals:  Blood pressure 126/55, pulse 60, temperature 36.1 °C (97 °F), resp. rate 17, height 1.753 m (5' 9\"), weight 73.3 kg (161 lb 9.6 oz), SpO2 95 %, not currently breastfeeding.  Temp (24hrs), Av.5 °C (97.7 °F), Min:36.1 °C (97 °F), Max:36.9 °C (98.5 °F)      IO:       Exam:  Respiratory: unlabored respirations, clear to A  Chest:  Flaps appear viable, DON drains mostly serosanguinous  Neuro: no focal deficits noted  Cardiovascular: regular rate and rhythm  GI: abdomen is soft and non-tender  Other: General: alert    Labs:  No results found for this or any previous visit (from the past 24 hour(s)).    Problem and Plan:  There are no active hospital problems to display for this patient.      Core Measures & Quality Metrics:  Labs reviewed and Medications reviewed  Shaikh catheter: No Shaikh      DVT Prophylaxis: Enoxaparin (Lovenox)  DVT prophylaxis - mechanical: SCDs  Ulcer prophylaxis: Yes      Imp: POD #1  S/p right total mastectomy, sentinel node and axillary node evaluation for breast cancer; s/p left total mastectomy with immediate bilateral breast reconstruction  Plan:  Teach DON drain management  Discharge to care of family  F/U with Dr. Shetty in one week and Dr. Meadows in 2 weeks  Pathology will be called to patient.  "

## 2018-03-11 NOTE — DISCHARGE INSTRUCTIONS
Discharge Instructions          Patient to see Dr. Shetty in one week.  She will see Dr. Meadows in 2 weeks but he will call with path results.  No showers til seen by Dr. Shetty in one week.   Send home with ABD pads to change dressings as needed.    Bulb Drain Home Care  A bulb drain consists of a thin rubber tube and a soft, round bulb that creates a gentle suction. The rubber tube is placed in the area where you had surgery. A bulb is attached to the end of the tube that is outside the body. The bulb drain removes excess fluid that normally builds up in a surgical wound after surgery. The color and amount of fluid will vary. Immediately after surgery, the fluid is bright red and is a little thicker than water. It may gradually change to a yellow or pink color and become more thin and water-like. When the amount decreases to about 1 or 2 tbsp in 24 hours, your health care provider will usually remove it.  DAILY CARE  · Keep the bulb flat (compressed) at all times, except while emptying it. The flatness creates suction. You can flatten the bulb by squeezing it firmly in the middle and then closing the cap.  · Keep sites where the tube enters the skin dry and covered with a bandage (dressing).  · Secure the tube 1-2 in (2.5-5.1 cm) below the insertion sites to keep it from pulling on your stitches. The tube is stitched in place and will not slip out.  · Secure the bulb as directed by your health care provider.  · For the first 3 days after surgery, there usually is more fluid in the bulb. Empty the bulb whenever it becomes half full because the bulb does not create enough suction if it is too full. The bulb could also overflow. Write down how much fluid you remove each time you empty your drain. Add up the amount removed in 24 hours.  · Empty the bulb at the same time every day once the amount of fluid decreases and you only need to empty it once a day. Write down the amounts and the 24-hour totals to give to your  health care provider. This helps your health care provider know when the tubes can be removed.  EMPTYING THE BULB DRAIN  Before emptying the bulb, get a measuring cup, a piece of paper and a pen, and wash your hands.  · Gently run your fingers down the tube (stripping) to empty any drainage from the tubing into the bulb. This may need to be done several times a day to clear the tubing of clots and tissue.  · Open the bulb cap to release suction, which causes it to inflate. Do not touch the inside of the cap.  · Gently run your fingers down the tube (stripping) to empty any drainage from the tubing into the bulb.  · Hold the cap out of the way, and pour fluid into the measuring cup.    · Squeeze the bulb to provide suction.   · Replace the cap.    · Check the tape that holds the tube to your skin. If it is becoming loose, you can remove the loose piece of tape and apply a new one. Then, pin the bulb to your shirt.    · Write down the amount of fluid you emptied out. Write down the date and each time you emptied your bulb drain. (If there are 2 bulbs, note the amount of drainage from each bulb and keep the totals separate. Your health care provider will want to know the total amounts for each drain and which tube is draining more.)    · Flush the fluid down the toilet and wash your hands.    · Call your health care provider once you have less than 2 tbsp of fluid collecting in the bulb drain every 24 hours.  If there is drainage around the tube site, change dressings and keep the area dry. Cleanse around tube with sterile saline and place dry gauze around site. This gauze should be changed when it is soiled. If it stays clean and unsoiled, it should still be changed daily.   SEEK MEDICAL CARE IF:  · Your drainage has a bad smell or is cloudy.    · You have a fever.    · Your drainage is increasing instead of decreasing.    · Your tube fell out.    · You have redness or swelling around the tube site.    · You have  drainage from a surgical wound.    · Your bulb drain will not stay flat after you empty it.    MAKE SURE YOU:   · Understand these instructions.  · Will watch your condition.  · Will get help right away if you are not doing well or get worse.     This information is not intended to replace advice given to you by your health care provider. Make sure you discuss any questions you have with your health care provider.     Document Released: 12/15/2001 Document Revised: 01/08/2016 Document Reviewed: 05/22/2013  [employer] Interactive Patient Education ©2016 Elsevier Inc.        Discharged to home by car with relative. Discharged via wheelchair, hospital escort: Yes.  Special equipment needed: Not Applicable    Be sure to schedule a follow-up appointment with your primary care doctor or any specialists as instructed.     Discharge Plan:   Diet Plan: Discussed  Activity Level: Discussed  Smoking Cessation Offered: Patient Refused  Confirmed Follow up Appointment: Patient to Call and Schedule Appointment  Confirmed Symptoms Management: Discussed  Medication Reconciliation Updated: Yes  Influenza Vaccine Indication: Indicated: 9 to 64 years of age  Influenza Vaccine Given - only chart on this line when given: Influenza Vaccine Given (See MAR)    I understand that a diet low in cholesterol, fat, and sodium is recommended for good health. Unless I have been given specific instructions below for another diet, I accept this instruction as my diet prescription.    Special Instructions: None    · Is patient discharged on Warfarin / Coumadin?   No     Depression / Suicide Risk    As you are discharged from this [facility] facility, it is important to learn how to keep safe from harming yourself.    Recognize the warning signs:  · Abrupt changes in personality, positive or negative- including increase in energy   · Giving away possessions  · Change in eating patterns- significant weight changes-  positive or negative  · Change in  sleeping patterns- unable to sleep or sleeping all the time   · Unwillingness or inability to communicate  · Depression  · Unusual sadness, discouragement and loneliness  · Talk of wanting to die  · Neglect of personal appearance   · Rebelliousness- reckless behavior  · Withdrawal from people/activities they love  · Confusion- inability to concentrate     If you or a loved one observes any of these behaviors or has concerns about self-harm, here's what you can do:  · Talk about it- your feelings and reasons for harming yourself  · Remove any means that you might use to hurt yourself (examples: pills, rope, extension cords, firearm)  · Get professional help from the community (Mental Health, Substance Abuse, psychological counseling)  · Do not be alone:Call your Safe Contact- someone whom you trust who will be there for you.  · Call your local CRISIS HOTLINE 118-7972 or 080-973-4494  · Call your local Children's Mobile Crisis Response Team [place] (333) 766-3543 or www.[employer]  · Call the toll free National Suicide Prevention Hotlines   · National Suicide Prevention Lifeline 072-016-MPTT (1650)  · National Hope Line Network 800-SUICIDE (312-4602)

## 2018-03-11 NOTE — PROGRESS NOTES
"Lakisha Lau 1955  [id number] 3/11/2018  10:27 AM        Surgical Progress Note:    Patient is doing fine.  They are tolerating a regular diet.  Pain is controlled.     Allergies   Allergen Reactions   • Codeine Shortness of Breath     Many years ago   • Soap Rash     .   • Tape Rash     Paper tape OK         PE:  /71   Pulse 62   Temp 36.8 °C (98.2 °F)   Resp 17   Ht 1.753 m (5' 9\")   Wt 73.3 kg (161 lb 9.6 oz)   LMP  (LMP Unknown)   SpO2 99%   Breastfeeding? No   BMI 23.86 kg/m²     bilateral mastectomy skin flaps are pink and viable. Incisions C/D/I. No hematoma or seroma. Appropriate bruising is present.       I/O:   Intake/Output Summary (Last 24 hours) at 03/11/18 1027  Last data filed at 03/11/18 1000   Gross per 24 hour   Intake             3600 ml   Output             2477 ml   Net             1123 ml         Labs:  Recent Labs      03/08/18   1347   WBC  7.9   RBC  4.25   HEMOGLOBIN  13.2   HEMATOCRIT  39.8   MCV  93.6   MCH  31.1   RDW  41.3   PLATELETCT  238   MPV  9.9   NEUTSPOLYS  78.00*   LYMPHOCYTES  14.30*   MONOCYTES  5.70   EOSINOPHILS  0.90   BASOPHILS  0.60     Recent Labs      03/08/18   1347   SODIUM  140   POTASSIUM  3.9   CHLORIDE  108   CO2  26   GLUCOSE  96   BUN  19         A/P: POD#  1  S/P bilateral mastectomy, tissue expanders and use of acellular dermal matrix.      Patient is doing well and can be discharged once tolerating diet, ambulating and pain is controlled. Postoperative restrictions and limitations reviewed including drain care. Patient should follow-up as previously scheduled with Dr. Shetty and the general surgeon. My contact information was given in case of emergency.  Signs and symptoms of infection and hematoma were reviewed.      German Shetty M.D.  "

## 2018-03-11 NOTE — CARE PLAN
Problem: Oxygenation/Respiratory Function-Post Surgical  Goal: Patient will Achieve/Maintain Normal Respiratory Rate/Effort    Intervention: Incentive Spirometry Promotion  IS given and instructed on use.  Patient achieves 2000, strong effort.

## 2018-03-11 NOTE — PROGRESS NOTES
O x 4, resting in bed.  Incisions to bilateral chest well approximated, dressings c/d/i.  Equal  to R and L hand.  DON x 4, compressed with small amount of serosanguinous drainage. Medicated for pain level 5/10 in surgical area. Up to restroom to void, steady gait.  Plan of care discussed, questions answered, verbalized understanding.

## 2018-03-11 NOTE — PROGRESS NOTES
Report received from PACU RN  Assessment complete.  A&O x 4. Patient calls appropriately.  Patient ambualtes with standby assist.   Patient has 5/10 pain. Declines pain medication at this time.   Denies N&V. Tolerating clear liquids.  Surgical incisions bilateral breasts; DON drains x4.  + void, - flatus  Patient denies SOB.  SCD's in use.  Patient oriented to floor and unit.  Review plan with of care with patient. Call light and personal belongings with in reach. Hourly rounding in place. All needs met at this time.

## 2018-03-11 NOTE — PROGRESS NOTES
Bedside report received.  Assessment complete.  A&O x 4. Patient calls appropriately.  Patient ambulates with standby assist.   Patient has 6/10 pain. Medicated per MAR.  Denies N&V. Tolerating regular diet.  Surgical incisions bilateral breasts; DON drains x4.  + void, + flatus  Patient denies SOB.  Review plan with of care with patient. Call light and personal belongings with in reach. Hourly rounding in place. All needs met at this time.

## 2018-03-11 NOTE — PROGRESS NOTES
Patient discharged to home with spouse. Patient discharge with 4 DON drains. Patient demonstrated proper care of DON drains and verbalized understanding of discharge instructions.     IV removed. Consent for opiates note required.

## 2018-03-11 NOTE — OP REPORT
DATE OF SERVICE:  03/10/2018    SURGEON:  Jung Meadows MD    ASSISTANT:  CHANDNI Garnica    SECOND SURGEON:  German Shetty MD    ANESTHESIOLOGIST:  Munir Choudhary MD    TYPE OF ANESTHETIC:  General endotracheal anesthesia plus local 0.5% Marcaine   with epinephrine.    PREOPERATIVE DIAGNOSIS:  Right breast carcinoma, triple negative, status post   neoadjuvant therapy.    POSTOPERATIVE DIAGNOSIS:  Right breast carcinoma, triple negative, status post   neoadjuvant therapy.    PROCEDURE PERFORMED:  Right total mastectomy, injection and identification for   right sentinel node, limited right axillary node dissection, left total   mastectomy, bilateral breast reconstructions by Dr. German Shetty.    FINDINGS:  The patient is a 62-year-old female who presented in August with   palpable breast mass in the upper outer quadrant of the right breast.  Biopsy   revealed a triple negative breast cancer.  She was referred to Dr. Greer, who   then proceed with neoadjuvant chemotherapy.  She tolerated this relatively   well and returned to discuss her surgical options.  The patient elected to   proceed with bilateral mastectomies with immediate reconstruction.  At the   time of surgery, she did have a sentinel node injection and biopsy performed.    There were no apparent complications.    FINDINGS AND PROCEDURE:  The patient was brought to the operating room and   placed on table in supine position.  General anesthetic was then provided by   the anesthesiologist, Dr. Choudhary.  Both breasts were prepped and draped in   usual sterile fashion.  A time-out was called.  The correct patient, correct   diagnosis, correct surgery, and correct location of the surgery were discussed   and agreed upon.  She did receive preoperative IV antibiotics.  An elliptical   incision was made around the nipple-areolar complex on the right breast with   #15 blade.  All bleeding was controlled with electrocautery.  Dissection was   then  carried down into the breast parenchyma.  Metcalfe tenaculums were then used   to elevate skin flaps.  A skin flap was raised superiorly up to the clavicle,   medial to lateral border of the sternum, inferiorly to the inframammary   crease, and laterally to the anterior border of latissimus dorsi muscle.    Prior to this dissection, 5 mL of isosulfan blue had been injected into the   right subareolar position and 5 minutes of breast massage had been performed.    The right breast was then removed from the chest wall in a medial to lateral   direction using the PlasmaBlade as well as the LigaSure device.  Once the   breast was removed off the lateral border of the pectoralis major muscle,   dissection was carried along the lateral border until the pectoralis minor   muscle was identified.  This was carried superiorly until the pectoral nerve   was identified.  Dissection was then carried out laterally across the axilla   through the clavipectoral fascia.  At this point, a blue dye tracing was noted   to be entering the deep portion of the right axilla.  Using LigaSure device,   several nodes were dissected out and sent to pathology as the sentinel nodes.    There were several other slightly enlarged nodes in this area and I therefore   elected to do a limited node dissection.  This was performed using the   LigaSure device.  Care was taken to avoid any injury to the long thoracic or   thoracodorsal nerves.  The right axillary vein was not skeletonized.  The   right breast attached eleazar contents were then removed and sent to pathology   for further characterization.  The wound was irrigated with approximately 1   liter of water.  There was no evidence of any bleeding.  Dr. German Shetty   proceeded with immediate reconstruction and that will be contained in a   separate dictation.  Next, the left breast was addressed.  An incision was   made around the nipple-areolar complex in an elliptical fashion similar to the    right side.  This incision was carried down into the breast parenchyma.    Scott tenaculums were then used to elevate skin flap.  A flap was raised   superiorly up to the clavicle, medially to the lateral border of sternum,   inferiorly down to the inframammary crease, and then laterally to the anterior   border of latissimus dorsi muscle.  Left breast was then removed from the   chest wall above the pectoralis major fascia in a medial to lateral direction.    All bleeding vessels were controlled with a LigaSure device.  Dissection was   then carried off the lateral border of the pectoralis major and minor muscle,   and then I crossed the axilla to obtain the axillary tail on the left side.    No attempt was made to get into the left axilla.  The entire left breast was   removed en bloc and sent to pathology for further characterization.    Irrigation was performed.  At this point, Dr. German Shetty performed an   immediate reconstruction on the left side and that too will be contained in a   separate dictation.  At the end of surgery, the patient tolerated the   procedure well without complications.  Final sponge and needle counts were   correct.  She was then extubated and transferred back to recovery room for   further postoperative care.       ____________________________________     MD VERONICA CRAIG / ARTEMIO    DD:  03/11/2018 13:12:41  DT:  03/11/2018 14:50:51    D#:  [id number]  Job#:  [id number]    cc: Delvin Blake MD, GERMAN SHETTY MD, Leslie Greer MD

## 2018-03-22 ENCOUNTER — TELEPHONE (OUTPATIENT)
Dept: HEMATOLOGY ONCOLOGY | Facility: MEDICAL CENTER | Age: 63
End: 2018-03-22

## 2018-03-22 NOTE — TELEPHONE ENCOUNTER
Spoke to Jany at Dr. Meadows' concerning a referral for gentics which was recommended at the breast tumor conference  She stated she would talk to Dr. Meadows and fax the referral if appropriate.

## 2018-04-19 ENCOUNTER — HOSPITAL ENCOUNTER (OUTPATIENT)
Dept: RADIOLOGY | Facility: MEDICAL CENTER | Age: 63
End: 2018-04-19
Attending: INTERNAL MEDICINE
Payer: COMMERCIAL

## 2018-04-19 DIAGNOSIS — C50.411 MALIGNANT NEOPLASM OF UPPER-OUTER QUADRANT OF RIGHT FEMALE BREAST, UNSPECIFIED ESTROGEN RECEPTOR STATUS (HCC): ICD-10-CM

## 2018-04-19 PROCEDURE — 700111 HCHG RX REV CODE 636 W/ 250 OVERRIDE (IP)

## 2018-04-19 PROCEDURE — 74160 CT ABDOMEN W/CONTRAST: CPT

## 2018-04-19 PROCEDURE — 700117 HCHG RX CONTRAST REV CODE 255: Performed by: INTERNAL MEDICINE

## 2018-04-19 RX ADMIN — IOHEXOL 100 ML: 350 INJECTION, SOLUTION INTRAVENOUS at 14:06

## 2018-04-19 RX ADMIN — HEPARIN 500 UNITS: 100 SYRINGE at 14:10

## 2018-04-19 RX ADMIN — IOHEXOL 50 ML: 240 INJECTION, SOLUTION INTRATHECAL; INTRAVASCULAR; INTRAVENOUS; ORAL at 12:59

## 2018-04-19 NOTE — PROGRESS NOTES
1400:  Port accessed per p&p, good blood return, flushed easily with NS.  1410:  Port de-accessed per p&p, good blood return noted, flushed easily with 20 cc's NS, and heparin flush per p&p, bandaid applied, no bleeding.

## 2018-06-14 ENCOUNTER — APPOINTMENT (OUTPATIENT)
Dept: ADMISSIONS | Facility: MEDICAL CENTER | Age: 63
End: 2018-06-14
Attending: PLASTIC SURGERY
Payer: COMMERCIAL

## 2018-06-14 DIAGNOSIS — Z01.812 PRE-OPERATIVE LABORATORY EXAMINATION: ICD-10-CM

## 2018-06-14 LAB
BASOPHILS # BLD AUTO: 0.5 % (ref 0–1.8)
BASOPHILS # BLD: 0.03 K/UL (ref 0–0.12)
EOSINOPHIL # BLD AUTO: 0.1 K/UL (ref 0–0.51)
EOSINOPHIL NFR BLD: 1.7 % (ref 0–6.9)
ERYTHROCYTE [DISTWIDTH] IN BLOOD BY AUTOMATED COUNT: 53.7 FL (ref 35.9–50)
HCT VFR BLD AUTO: 38.9 % (ref 37–47)
HGB BLD-MCNC: 12.9 G/DL (ref 12–16)
IMM GRANULOCYTES # BLD AUTO: 0.02 K/UL (ref 0–0.11)
IMM GRANULOCYTES NFR BLD AUTO: 0.3 % (ref 0–0.9)
LYMPHOCYTES # BLD AUTO: 1.18 K/UL (ref 1–4.8)
LYMPHOCYTES NFR BLD: 19.8 % (ref 22–41)
MCH RBC QN AUTO: 32.3 PG (ref 27–33)
MCHC RBC AUTO-ENTMCNC: 33.2 G/DL (ref 33.6–35)
MCV RBC AUTO: 97.5 FL (ref 81.4–97.8)
MONOCYTES # BLD AUTO: 0.37 K/UL (ref 0–0.85)
MONOCYTES NFR BLD AUTO: 6.2 % (ref 0–13.4)
NEUTROPHILS # BLD AUTO: 4.26 K/UL (ref 2–7.15)
NEUTROPHILS NFR BLD: 71.5 % (ref 44–72)
NRBC # BLD AUTO: 0 K/UL
NRBC BLD-RTO: 0 /100 WBC
PLATELET # BLD AUTO: 242 K/UL (ref 164–446)
PMV BLD AUTO: 9.6 FL (ref 9–12.9)
RBC # BLD AUTO: 3.99 M/UL (ref 4.2–5.4)
WBC # BLD AUTO: 6 K/UL (ref 4.8–10.8)

## 2018-06-14 PROCEDURE — 36415 COLL VENOUS BLD VENIPUNCTURE: CPT

## 2018-06-14 PROCEDURE — 85025 COMPLETE CBC W/AUTO DIFF WBC: CPT

## 2018-06-21 ENCOUNTER — HOSPITAL ENCOUNTER (OUTPATIENT)
Facility: MEDICAL CENTER | Age: 63
End: 2018-06-21
Attending: PLASTIC SURGERY | Admitting: PLASTIC SURGERY
Payer: COMMERCIAL

## 2018-06-21 VITALS
SYSTOLIC BLOOD PRESSURE: 114 MMHG | DIASTOLIC BLOOD PRESSURE: 56 MMHG | WEIGHT: 158.73 LBS | HEART RATE: 69 BPM | HEIGHT: 69 IN | OXYGEN SATURATION: 100 % | TEMPERATURE: 97.7 F | BODY MASS INDEX: 23.51 KG/M2 | RESPIRATION RATE: 18 BRPM

## 2018-06-21 DIAGNOSIS — G89.18 POSTOPERATIVE PAIN: ICD-10-CM

## 2018-06-21 PROCEDURE — 700111 HCHG RX REV CODE 636 W/ 250 OVERRIDE (IP)

## 2018-06-21 PROCEDURE — 160029 HCHG SURGERY MINUTES - 1ST 30 MINS LEVEL 4: Performed by: PLASTIC SURGERY

## 2018-06-21 PROCEDURE — 500438 HCHG DRESSING, SPANDAGE #10 12: Performed by: PLASTIC SURGERY

## 2018-06-21 PROCEDURE — 700102 HCHG RX REV CODE 250 W/ 637 OVERRIDE(OP)

## 2018-06-21 PROCEDURE — 160002 HCHG RECOVERY MINUTES (STAT): Performed by: PLASTIC SURGERY

## 2018-06-21 PROCEDURE — A6402 STERILE GAUZE <= 16 SQ IN: HCPCS | Performed by: PLASTIC SURGERY

## 2018-06-21 PROCEDURE — A9270 NON-COVERED ITEM OR SERVICE: HCPCS

## 2018-06-21 PROCEDURE — 501838 HCHG SUTURE GENERAL: Performed by: PLASTIC SURGERY

## 2018-06-21 PROCEDURE — 160009 HCHG ANES TIME/MIN: Performed by: PLASTIC SURGERY

## 2018-06-21 PROCEDURE — 160048 HCHG OR STATISTICAL LEVEL 1-5: Performed by: PLASTIC SURGERY

## 2018-06-21 PROCEDURE — 700101 HCHG RX REV CODE 250

## 2018-06-21 PROCEDURE — 501445 HCHG STAPLER, SKIN DISP: Performed by: PLASTIC SURGERY

## 2018-06-21 PROCEDURE — 160036 HCHG PACU - EA ADDL 30 MINS PHASE I: Performed by: PLASTIC SURGERY

## 2018-06-21 PROCEDURE — 160041 HCHG SURGERY MINUTES - EA ADDL 1 MIN LEVEL 4: Performed by: PLASTIC SURGERY

## 2018-06-21 PROCEDURE — 500423 HCHG DRESSING, ABD COMBINE: Performed by: PLASTIC SURGERY

## 2018-06-21 PROCEDURE — 160035 HCHG PACU - 1ST 60 MINS PHASE I: Performed by: PLASTIC SURGERY

## 2018-06-21 PROCEDURE — 502000 HCHG MISC OR IMPLANTS RC 0278: Performed by: PLASTIC SURGERY

## 2018-06-21 DEVICE — IMPLANTABLE DEVICE: Type: IMPLANTABLE DEVICE | Site: BREAST | Status: FUNCTIONAL

## 2018-06-21 RX ORDER — SODIUM CHLORIDE, SODIUM LACTATE, POTASSIUM CHLORIDE, CALCIUM CHLORIDE 600; 310; 30; 20 MG/100ML; MG/100ML; MG/100ML; MG/100ML
INJECTION, SOLUTION INTRAVENOUS CONTINUOUS
Status: DISCONTINUED | OUTPATIENT
Start: 2018-06-21 | End: 2018-06-21 | Stop reason: HOSPADM

## 2018-06-21 RX ORDER — BUPIVACAINE HYDROCHLORIDE 5 MG/ML
INJECTION, SOLUTION PERINEURAL
Status: DISCONTINUED | OUTPATIENT
Start: 2018-06-21 | End: 2018-06-21 | Stop reason: HOSPADM

## 2018-06-21 RX ORDER — ONDANSETRON 2 MG/ML
4 INJECTION INTRAMUSCULAR; INTRAVENOUS EVERY 4 HOURS PRN
Status: DISCONTINUED | OUTPATIENT
Start: 2018-06-21 | End: 2018-06-21 | Stop reason: HOSPADM

## 2018-06-21 RX ORDER — HYDROCODONE BITARTRATE AND ACETAMINOPHEN 5; 325 MG/1; MG/1
1-2 TABLET ORAL EVERY 4 HOURS PRN
Qty: 20 TAB | Refills: 0 | Status: SHIPPED | OUTPATIENT
Start: 2018-06-21 | End: 2018-06-24

## 2018-06-21 RX ORDER — OXYCODONE HCL 5 MG/5 ML
SOLUTION, ORAL ORAL
Status: COMPLETED
Start: 2018-06-21 | End: 2018-06-21

## 2018-06-21 RX ORDER — CEFAZOLIN SODIUM 1 G/3ML
INJECTION, POWDER, FOR SOLUTION INTRAMUSCULAR; INTRAVENOUS
Status: DISCONTINUED
Start: 2018-06-21 | End: 2018-06-21 | Stop reason: HOSPADM

## 2018-06-21 RX ORDER — BUPIVACAINE HYDROCHLORIDE AND EPINEPHRINE 5; 5 MG/ML; UG/ML
INJECTION, SOLUTION EPIDURAL; INTRACAUDAL; PERINEURAL
Status: DISCONTINUED
Start: 2018-06-21 | End: 2018-06-21 | Stop reason: HOSPADM

## 2018-06-21 RX ORDER — HYDROCODONE BITARTRATE AND ACETAMINOPHEN 10; 325 MG/1; MG/1
2 TABLET ORAL EVERY 4 HOURS PRN
Status: DISCONTINUED | OUTPATIENT
Start: 2018-06-21 | End: 2018-06-21 | Stop reason: HOSPADM

## 2018-06-21 RX ORDER — BACITRACIN 50000 [IU]/1
INJECTION, POWDER, FOR SOLUTION INTRAMUSCULAR
Status: DISCONTINUED
Start: 2018-06-21 | End: 2018-06-21 | Stop reason: HOSPADM

## 2018-06-21 RX ORDER — ACETAMINOPHEN 325 MG/1
325 TABLET ORAL EVERY 4 HOURS PRN
Status: DISCONTINUED | OUTPATIENT
Start: 2018-06-21 | End: 2018-06-21 | Stop reason: HOSPADM

## 2018-06-21 RX ORDER — SULFAMETHOXAZOLE AND TRIMETHOPRIM 800; 160 MG/1; MG/1
1 TABLET ORAL 2 TIMES DAILY
Qty: 14 TAB | Refills: 0 | Status: SHIPPED | OUTPATIENT
Start: 2018-06-21 | End: 2018-10-23

## 2018-06-21 RX ORDER — ACETAMINOPHEN 650 MG/1
650 SUPPOSITORY RECTAL EVERY 4 HOURS PRN
Status: DISCONTINUED | OUTPATIENT
Start: 2018-06-21 | End: 2018-06-21 | Stop reason: HOSPADM

## 2018-06-21 RX ORDER — KETAMINE HYDROCHLORIDE 50 MG/ML
INJECTION, SOLUTION INTRAMUSCULAR; INTRAVENOUS
Status: DISCONTINUED
Start: 2018-06-21 | End: 2018-06-21 | Stop reason: HOSPADM

## 2018-06-21 RX ORDER — HYDROCODONE BITARTRATE AND ACETAMINOPHEN 10; 325 MG/1; MG/1
1 TABLET ORAL EVERY 4 HOURS PRN
Status: DISCONTINUED | OUTPATIENT
Start: 2018-06-21 | End: 2018-06-21 | Stop reason: HOSPADM

## 2018-06-21 RX ORDER — MORPHINE SULFATE 4 MG/ML
2.5 INJECTION, SOLUTION INTRAMUSCULAR; INTRAVENOUS
Status: DISCONTINUED | OUTPATIENT
Start: 2018-06-21 | End: 2018-06-21 | Stop reason: HOSPADM

## 2018-06-21 RX ORDER — ONDANSETRON 4 MG/1
4 TABLET, FILM COATED ORAL EVERY 4 HOURS PRN
Qty: 5 TAB | Refills: 1 | Status: SHIPPED | OUTPATIENT
Start: 2018-06-21 | End: 2018-10-23

## 2018-06-21 RX ORDER — SODIUM CHLORIDE, SODIUM LACTATE, POTASSIUM CHLORIDE, CALCIUM CHLORIDE 600; 310; 30; 20 MG/100ML; MG/100ML; MG/100ML; MG/100ML
INJECTION, SOLUTION INTRAVENOUS
Status: DISCONTINUED | OUTPATIENT
Start: 2018-06-21 | End: 2018-06-21 | Stop reason: HOSPADM

## 2018-06-21 RX ADMIN — FENTANYL CITRATE 50 MCG: 50 INJECTION, SOLUTION INTRAMUSCULAR; INTRAVENOUS at 12:44

## 2018-06-21 RX ADMIN — OXYCODONE HYDROCHLORIDE 10 MG: 5 SOLUTION ORAL at 12:44

## 2018-06-21 RX ADMIN — SODIUM CHLORIDE, SODIUM LACTATE, POTASSIUM CHLORIDE, CALCIUM CHLORIDE: 600; 310; 30; 20 INJECTION, SOLUTION INTRAVENOUS at 09:55

## 2018-06-21 ASSESSMENT — PAIN SCALES - GENERAL
PAINLEVEL_OUTOF10: 2
PAINLEVEL_OUTOF10: 5
PAINLEVEL_OUTOF10: 3
PAINLEVEL_OUTOF10: 0
PAINLEVEL_OUTOF10: 2
PAINLEVEL_OUTOF10: 4
PAINLEVEL_OUTOF10: 2
PAINLEVEL_OUTOF10: 2

## 2018-06-21 NOTE — DISCHARGE INSTRUCTIONS
ACTIVITY: Rest and take it easy for the first 24 hours.  A responsible adult is recommended to remain with you during that time.  It is normal to feel sleepy.  We encourage you to not do anything that requires balance, judgment or coordination.    MILD FLU-LIKE SYMPTOMS ARE NORMAL. YOU MAY EXPERIENCE GENERALIZED MUSCLE ACHES, THROAT IRRITATION, HEADACHE AND/OR SOME NAUSEA.    FOR 24 HOURS DO NOT:  Drive, operate machinery or run household appliances.  Drink beer or alcoholic beverages.   Make important decisions or sign legal documents.    DIET: To avoid nausea, slowly advance diet as tolerated, avoiding spicy or greasy foods for the first day.  Add more substantial food to your diet according to your physician's instructions. INCREASE FLUIDS AND FIBER TO AVOID CONSTIPATION.    SURGICAL DRESSING/BATHING: Keep dressing dry and clean for 3 days then okay to shower.  Cool compresses as needed.    FOLLOW-UP APPOINTMENT:  Return to see Dr. Shetty on Monday, June     You should CALL YOUR PHYSICIAN if you develop:  Fever greater than 101 degrees F.  Pain not relieved by medication, or persistent nausea or vomiting.  Excessive bleeding (blood soaking through dressing) or unexpected drainage from the wound.  Extreme redness or swelling around the incision site, drainage of pus or foul smelling drainage.  Inability to urinate or empty your bladder within 8 hours.  Problems with breathing or chest pain.    You should call 911 if you develop problems with breathing or chest pain.  If you are unable to contact your doctor or surgical center, you should go to the nearest emergency room or urgent care center.  Physician's telephone #: [phone].    If any questions arise, call your doctor.  If your doctor is not available, please feel free to call the Surgical Center at [phone].  The Center is open Monday through Friday from 7AM to 7PM.  You can also call the HEALTH HOTLINE open 24 hours/day, 7 days/week and speak to a  nurse at [phone], or toll free at [phone].    A registered nurse may call you a few days after your surgery to see how you are doing after your procedure.    MEDICATIONS: Resume taking daily medication.  Take prescribed pain medication with food.  If no medication is prescribed, you may take non-aspirin pain medication if needed.  PAIN MEDICATION CAN BE VERY CONSTIPATING.  Take a stool softener or laxative such as senokot, pericolace, or milk of magnesia if needed.    Prescription given for Bactrim (antibiotic-take after 7PM), Zofran (nausea), Norco (pain).  Last pain medication given at 12:45 PM.    If your physician has prescribed pain medication that includes Acetaminophen (Tylenol), do not take additional Acetaminophen (Tylenol) while taking the prescribed medication.    · Ensure safe storage of your medications in their original containers and out of the reach of others.  · Take unused medications to a Drug Enforcement Administration public disposal location (https://apps.deadiversion.[employer].gov/pubdispsearch/)    Depression / Suicide Risk    As you are discharged from this [facility] facility, it is important to learn how to keep safe from harming yourself.    Recognize the warning signs:  · Abrupt changes in personality, positive or negative- including increase in energy   · Giving away possessions  · Change in eating patterns- significant weight changes-  positive or negative  · Change in sleeping patterns- unable to sleep or sleeping all the time   · Unwillingness or inability to communicate  · Depression  · Unusual sadness, discouragement and loneliness  · Talk of wanting to die  · Neglect of personal appearance   · Rebelliousness- reckless behavior  · Withdrawal from people/activities they love  · Confusion- inability to concentrate     If you or a loved one observes any of these behaviors or has concerns about self-harm, here's what you can do:  · Talk about it- your feelings and reasons  for harming yourself  · Remove any means that you might use to hurt yourself (examples: pills, rope, extension cords, firearm)  · Get professional help from the community (Mental Health, Substance Abuse, psychological counseling)  · Do not be alone:Call your Safe Contact- someone whom you trust who will be there for you.  · Call your local CRISIS HOTLINE 009-3665 or 946-353-6667  · Call your local Children's Mobile Crisis Response Team [place] (326) 477-4000 or www.[employer]  · Call the toll free National Suicide Prevention Hotlines   · National Suicide Prevention Lifeline 785-690-LCKC (9092)  · National Hope Line Network 800-SUICIDE (902-2137)

## 2018-06-21 NOTE — OP REPORT
DATE OF SERVICE:  06/21/2018    PREOPERATIVE DIAGNOSIS:  History of breast cancer.    POSTOPERATIVE DIAGNOSIS:  History of breast cancer.    PROCEDURES PERFORMED:  1.  Bilateral tissue expander removal and gel implant placement.  2.  Bilateral medial capsulectomy with lateral capsulorrhaphy.  3.  Bilateral breast reconstruction with fat grafting.  4.  Port-A-Cath removal from the right upper chest wall.  5.  Fat grafting to the right upper chest wall for the Port-A-Cath defect.    ATTENDING SURGEON:  German Shetty MD    ANESTHESIOLOGIST:  Jeff Cervantes MD    SPECIMENS:  None.    ESTIMATED BLOOD LOSS:  Minimal.    COMPLICATIONS:  No apparent.    INDICATIONS FOR PROCEDURE:  Patient is a 53-year-old woman who previously had   breast cancer and underwent bilateral mastectomies and reconstruction with   placement of tissue expanders.  She has gone on to successful tissue expansion   and now presents for the next phase of her reconstruction.    INTRAOPERATIVE FINDINGS:  Allergan style SCF Natrelle Inspira cohesive breast   implants, 520 mL were placed bilaterally.  On the right side, the reference is   number [id number], same size implant on both sides.  For the fat grafting, there   was about 40 mL of fat grafted in each of the reconstructed breast and about   20 mL of fat graft into port, the port site on the upper chest wall.    DESCRIPTION OF PROCEDURE:  After the operative and nonoperative options were   discussed and include was not limited to bleeding, infection, damage to   surrounding structures, need for further surgery, reaction to anesthetic   agent, scarring, breast asymmetry, contour irregularities, implant failure,   implant rupture, capsular contracture development, need for revisional   surgery, wound healing difficulties, deep venous thrombosis, pulmonary   embolus, myocardial infarction, stroke, unsatisfactory result and/or death,   informed consent was obtained.    The patient was identified.  In a  sitting upright position, patient's sternal   notch midline inframammary folds were marked.  The pocket modifications were   marked out.  The port and right upper chest was marked.  Areas for fat graft   harvest were marked in the supra and infraumbilical abdomen.  Antibiotics   given, sequential compression devices placed.  Patient was brought back to   operating room where general anesthesia was induced.  Her chest, abdomen and   flank were prepped and draped in usual sterile fashion.  Starting on the right   upper chest wall, incision was made over the Port-A-Cath.  Cautery was used   to dissect down to the port itself.  Dissection was then carried out around   the capsule to free this up.  Once the Port-A-Cath was freed up, was then   removed and found to be fully intact.  Pressure was then applied for 10   minutes in this area.  The cavity was then closed with 4-0 Monocryl sutures in   the deep tissue and deep dermis and running 4-0 Monocryl subcuticular stitch   to close the overlying skin.    We then turned our attention to the right breast.  An incision was made along   the old mastectomy scar.  Cautery was then used to dissect down through the   underlying tissue down to the underlying capsule, which was opened up.  The   tissue expander was then deflated and removed.  The pocket was then irrigated   with triple antibiotic irrigation.  Partial capsulectomy was then performed in   the medial aspect of the pocket where the pectoralis major muscle was.  This   pectoralis major muscle was then further elevated medially.  Out laterally,   the patient's pocket had widened and was too lateral.  Extensive   capsulorrhaphy was performed with a number of interrupted 2-0 Vicryl sutures   to plicate this pocket.  Also, there was a portion of acellular dermal matrix   that had not incorporated and so quilting sutures were placed with 2-0 Vicryl   sutures to further support the pocket.  After this was then done, the  patient   was sat upright and sizers were tried out.  Ultimately, I felt that the 520 mL   implant would be most appropriate.  Patient was put back down in the supine   position, the sizer was removed.  The pocket was then copiously irrigated,   then using a minimal touch technique, triple antibiotic irrigation and   Betadine on the skin, the implant was placed.  The deep tissue was then closed   with 2-0 Vicryl sutures.  The dermis with 3-0 deep dermal Monocryl sutures   and running 4-0 Monocryl subcuticular stitch was used to close the overlying   skin.    We then turned our attention to the contralateral breast, the same procedure   was performed, the same size implant was placed.    Poke incision was made in the bilateral lower quadrant.  Tumescent solution   was then placed in the supra and infraumbilical abdomen.  Adequate time was   allowed for the hemostatic effects of epinephrine to take effect.  Fat was   then harvested from all of these locations.  The fat was then collected into   the [employer] fat harvesting system.  The fat was then irrigated per protocol.    The fat was then loaded in 20 mL syringes on the back table.  A poke incision   was made medially on the breasts bilaterally.  Fat was then grafted into the   breast using a fanning technique.  Care was taken to ensure the equal amounts   of fat were then placed with each passage of the cannula.  I then fat grafted   the area where the previous port was to help prevent contour irregularity.    About 40 mL of fat was then placed in each of the breast, about 20 mL in the   area where the port was located.  The poke incisions both on the breast,   abdomen, around the port were then closed with interrupted 5-0 fast absorbing   sutures.  The patient was then washed.  Steri-Strips and compressive dressings   were then placed.  She was then awakened, extubated and transferred to the   PACU in stable condition.  At the end of procedure, all sponge,  instrument and   needle counts were correct.       ____________________________________     MD KHLOE JANE / ARTEMIO    DD:  06/21/2018 12:35:26  DT:  06/21/2018 13:08:19    D#:  [id number]  Job#:  [id number]

## 2018-06-21 NOTE — OR NURSING
1233 Arrives per cart to PACU, accompanied by OR staff. Patient is sleepy, O2 on per nasal cannula, monitors applied.  Handoff report received from anesthesia and OR nursing personnel.  Rates pain at level 5 on 0-10 scale, will medicate with po and IV medication.    1258 O2 decreased to 2 LPM. Dressings are dry and intact to chest.    1310 O2 DC'd, will continue to monitor SPO2 levels.   in to see.  given prescriptions x 3, left to get them filled.    1345 Patient alert, eating karly crackers and sipping soda. Rates pain at level 2.    1410 States is ready to go home, monitors removed, up to dress with 's assist.    1430 To bathroom with 1 assist and voided, settled in wheelchair.    1440 Discharge instructions reviewed with patient and her . They verbalized understanding. Patient discharged per wheelchair with belongings and discharge instructions.  in accompaniment.

## 2018-06-21 NOTE — OR SURGEON
Immediate Post OP Note    PreOp Diagnosis: history of breast cancer    PostOp Diagnosis: same    Procedure(s):  TISSUE EXPANDER PLACE/REMOVE- REMOVAL WITH PLACEMENT OF GEL IMPLANTS, PARTIAL CAPSULECTOMY - Wound Class: Clean  BREAST RECONSTRUCTION AND REVISION OF DERMAL GLANDULAR FLAPS AND FAT GRAFTING - Wound Class: Clean  CATH REMOVAL - Wound Class: Clean    Surgeon(s):  German Shetty M.D.    Anesthesiologist/Type of Anesthesia:  Anesthesiologist: Jeff Cervantes M.D./General    Surgical Staff:  Circulator: Symone Erazo R.N.  Scrub Person: Caitlyn Frazier    Specimens removed if any:  * No specimens in log *    Estimated Blood Loss: minimal    Findings: see operative note    Complications: no apparent    #[id number]    6/21/2018 10:52 AM German Shetty M.D.

## 2018-10-15 ENCOUNTER — HOSPITAL ENCOUNTER (OUTPATIENT)
Dept: RADIOLOGY | Facility: MEDICAL CENTER | Age: 63
End: 2018-10-15
Attending: INTERNAL MEDICINE
Payer: COMMERCIAL

## 2018-10-15 DIAGNOSIS — C50.411 MALIGNANT NEOPLASM OF UPPER-OUTER QUADRANT OF RIGHT FEMALE BREAST, UNSPECIFIED ESTROGEN RECEPTOR STATUS (HCC): ICD-10-CM

## 2018-10-15 PROCEDURE — 700117 HCHG RX CONTRAST REV CODE 255: Performed by: INTERNAL MEDICINE

## 2018-10-15 PROCEDURE — 71260 CT THORAX DX C+: CPT

## 2018-10-15 RX ADMIN — IOHEXOL 75 ML: 350 INJECTION, SOLUTION INTRAVENOUS at 12:15

## 2018-10-23 ENCOUNTER — APPOINTMENT (OUTPATIENT)
Dept: ADMISSIONS | Facility: MEDICAL CENTER | Age: 63
End: 2018-10-23
Attending: PLASTIC SURGERY
Payer: COMMERCIAL

## 2018-10-23 DIAGNOSIS — Z01.812 PRE-OPERATIVE LABORATORY EXAMINATION: ICD-10-CM

## 2018-10-23 LAB
BASOPHILS # BLD AUTO: 0.5 % (ref 0–1.8)
BASOPHILS # BLD: 0.04 K/UL (ref 0–0.12)
EOSINOPHIL # BLD AUTO: 0.1 K/UL (ref 0–0.51)
EOSINOPHIL NFR BLD: 1.4 % (ref 0–6.9)
ERYTHROCYTE [DISTWIDTH] IN BLOOD BY AUTOMATED COUNT: 43.3 FL (ref 35.9–50)
HCT VFR BLD AUTO: 41.5 % (ref 37–47)
HGB BLD-MCNC: 13.6 G/DL (ref 12–16)
IMM GRANULOCYTES # BLD AUTO: 0.03 K/UL (ref 0–0.11)
IMM GRANULOCYTES NFR BLD AUTO: 0.4 % (ref 0–0.9)
LYMPHOCYTES # BLD AUTO: 1.12 K/UL (ref 1–4.8)
LYMPHOCYTES NFR BLD: 15.4 % (ref 22–41)
MCH RBC QN AUTO: 31.3 PG (ref 27–33)
MCHC RBC AUTO-ENTMCNC: 32.8 G/DL (ref 33.6–35)
MCV RBC AUTO: 95.4 FL (ref 81.4–97.8)
MONOCYTES # BLD AUTO: 0.49 K/UL (ref 0–0.85)
MONOCYTES NFR BLD AUTO: 6.7 % (ref 0–13.4)
NEUTROPHILS # BLD AUTO: 5.5 K/UL (ref 2–7.15)
NEUTROPHILS NFR BLD: 75.6 % (ref 44–72)
NRBC # BLD AUTO: 0 K/UL
NRBC BLD-RTO: 0 /100 WBC
PLATELET # BLD AUTO: 260 K/UL (ref 164–446)
PMV BLD AUTO: 10 FL (ref 9–12.9)
RBC # BLD AUTO: 4.35 M/UL (ref 4.2–5.4)
WBC # BLD AUTO: 7.3 K/UL (ref 4.8–10.8)

## 2018-10-23 PROCEDURE — 85025 COMPLETE CBC W/AUTO DIFF WBC: CPT

## 2018-10-23 PROCEDURE — 36415 COLL VENOUS BLD VENIPUNCTURE: CPT

## 2018-10-23 RX ORDER — VITAMIN E 268 MG
400 CAPSULE ORAL EVERY MORNING
COMMUNITY

## 2018-10-23 RX ORDER — ASCORBIC ACID 500 MG
500 TABLET ORAL EVERY MORNING
COMMUNITY

## 2018-10-30 ENCOUNTER — TELEPHONE (OUTPATIENT)
Dept: HEMATOLOGY ONCOLOGY | Facility: MEDICAL CENTER | Age: 63
End: 2018-10-30

## 2018-10-30 NOTE — TELEPHONE ENCOUNTER
2nd attempt to schedule patient:    Left voicemail for patient requesting a return call to schedule a new patient Oncology genetics appointment with Dr. Boyd.    NP/ [facility]/Ref: uJng Meadows/Dx: Breast cancer

## 2018-11-07 NOTE — TELEPHONE ENCOUNTER
3rd attempt to schedule patient:    Left voicemail for patient requesting a return call to schedule a new patient Oncology genetics appointment with Dr. Boyd.    NP/ [facility]/Ref: Jung Meadows/Dx: Breast cancer

## 2018-11-08 ENCOUNTER — HOSPITAL ENCOUNTER (OUTPATIENT)
Facility: MEDICAL CENTER | Age: 63
End: 2018-11-08
Attending: PLASTIC SURGERY | Admitting: PLASTIC SURGERY
Payer: COMMERCIAL

## 2018-11-08 VITALS
RESPIRATION RATE: 20 BRPM | HEART RATE: 71 BPM | HEIGHT: 68 IN | WEIGHT: 162.48 LBS | BODY MASS INDEX: 24.62 KG/M2 | OXYGEN SATURATION: 78 % | TEMPERATURE: 98 F

## 2018-11-08 PROCEDURE — 501838 HCHG SUTURE GENERAL: Performed by: PLASTIC SURGERY

## 2018-11-08 PROCEDURE — 700111 HCHG RX REV CODE 636 W/ 250 OVERRIDE (IP)

## 2018-11-08 PROCEDURE — 160036 HCHG PACU - EA ADDL 30 MINS PHASE I: Performed by: PLASTIC SURGERY

## 2018-11-08 PROCEDURE — 501445 HCHG STAPLER, SKIN DISP: Performed by: PLASTIC SURGERY

## 2018-11-08 PROCEDURE — A6223 GAUZE >16<=48 NO W/SAL W/O B: HCPCS | Performed by: PLASTIC SURGERY

## 2018-11-08 PROCEDURE — 500881 HCHG PACK, EXTREMITY: Performed by: PLASTIC SURGERY

## 2018-11-08 PROCEDURE — 160002 HCHG RECOVERY MINUTES (STAT): Performed by: PLASTIC SURGERY

## 2018-11-08 PROCEDURE — A6403 STERILE GAUZE>16 <= 48 SQ IN: HCPCS | Performed by: PLASTIC SURGERY

## 2018-11-08 PROCEDURE — 160009 HCHG ANES TIME/MIN: Performed by: PLASTIC SURGERY

## 2018-11-08 PROCEDURE — 700101 HCHG RX REV CODE 250

## 2018-11-08 PROCEDURE — 500438 HCHG DRESSING, SPANDAGE #10 12: Performed by: PLASTIC SURGERY

## 2018-11-08 PROCEDURE — 160041 HCHG SURGERY MINUTES - EA ADDL 1 MIN LEVEL 4: Performed by: PLASTIC SURGERY

## 2018-11-08 PROCEDURE — 160035 HCHG PACU - 1ST 60 MINS PHASE I: Performed by: PLASTIC SURGERY

## 2018-11-08 PROCEDURE — 500423 HCHG DRESSING, ABD COMBINE: Performed by: PLASTIC SURGERY

## 2018-11-08 PROCEDURE — 160029 HCHG SURGERY MINUTES - 1ST 30 MINS LEVEL 4: Performed by: PLASTIC SURGERY

## 2018-11-08 PROCEDURE — 160048 HCHG OR STATISTICAL LEVEL 1-5: Performed by: PLASTIC SURGERY

## 2018-11-08 RX ORDER — CEFAZOLIN SODIUM 1 G/3ML
INJECTION, POWDER, FOR SOLUTION INTRAMUSCULAR; INTRAVENOUS
Status: DISCONTINUED
Start: 2018-11-08 | End: 2018-11-08 | Stop reason: HOSPADM

## 2018-11-08 RX ORDER — HYDROMORPHONE HYDROCHLORIDE 1 MG/ML
0.1 INJECTION, SOLUTION INTRAMUSCULAR; INTRAVENOUS; SUBCUTANEOUS
Status: DISCONTINUED | OUTPATIENT
Start: 2018-11-08 | End: 2018-11-08 | Stop reason: HOSPADM

## 2018-11-08 RX ORDER — HYDROMORPHONE HYDROCHLORIDE 1 MG/ML
0.2 INJECTION, SOLUTION INTRAMUSCULAR; INTRAVENOUS; SUBCUTANEOUS
Status: DISCONTINUED | OUTPATIENT
Start: 2018-11-08 | End: 2018-11-08 | Stop reason: HOSPADM

## 2018-11-08 RX ORDER — DIPHENHYDRAMINE HYDROCHLORIDE 50 MG/ML
12.5 INJECTION INTRAMUSCULAR; INTRAVENOUS
Status: DISCONTINUED | OUTPATIENT
Start: 2018-11-08 | End: 2018-11-08 | Stop reason: HOSPADM

## 2018-11-08 RX ORDER — HALOPERIDOL 5 MG/ML
1 INJECTION INTRAMUSCULAR
Status: DISCONTINUED | OUTPATIENT
Start: 2018-11-08 | End: 2018-11-08 | Stop reason: HOSPADM

## 2018-11-08 RX ORDER — SODIUM CHLORIDE, SODIUM LACTATE, POTASSIUM CHLORIDE, CALCIUM CHLORIDE 600; 310; 30; 20 MG/100ML; MG/100ML; MG/100ML; MG/100ML
INJECTION, SOLUTION INTRAVENOUS CONTINUOUS
Status: DISCONTINUED | OUTPATIENT
Start: 2018-11-08 | End: 2018-11-08 | Stop reason: HOSPADM

## 2018-11-08 RX ORDER — MIDAZOLAM HYDROCHLORIDE 1 MG/ML
INJECTION INTRAMUSCULAR; INTRAVENOUS
Status: DISCONTINUED
Start: 2018-11-08 | End: 2018-11-08 | Stop reason: HOSPADM

## 2018-11-08 RX ORDER — KETAMINE HYDROCHLORIDE 50 MG/ML
INJECTION, SOLUTION INTRAMUSCULAR; INTRAVENOUS
Status: DISCONTINUED
Start: 2018-11-08 | End: 2018-11-08 | Stop reason: HOSPADM

## 2018-11-08 RX ORDER — HYDROMORPHONE HYDROCHLORIDE 1 MG/ML
0.4 INJECTION, SOLUTION INTRAMUSCULAR; INTRAVENOUS; SUBCUTANEOUS
Status: DISCONTINUED | OUTPATIENT
Start: 2018-11-08 | End: 2018-11-08 | Stop reason: HOSPADM

## 2018-11-08 RX ORDER — BACITRACIN ZINC 500 [USP'U]/G
OINTMENT TOPICAL
Status: DISCONTINUED
Start: 2018-11-08 | End: 2018-11-08 | Stop reason: HOSPADM

## 2018-11-08 RX ORDER — ONDANSETRON 2 MG/ML
4 INJECTION INTRAMUSCULAR; INTRAVENOUS
Status: DISCONTINUED | OUTPATIENT
Start: 2018-11-08 | End: 2018-11-08 | Stop reason: HOSPADM

## 2018-11-08 RX ORDER — ACETAMINOPHEN 500 MG
1000 TABLET ORAL ONCE
Status: DISCONTINUED | OUTPATIENT
Start: 2018-11-08 | End: 2018-11-08 | Stop reason: HOSPADM

## 2018-11-08 RX ORDER — SODIUM CHLORIDE, SODIUM LACTATE, POTASSIUM CHLORIDE, CALCIUM CHLORIDE 600; 310; 30; 20 MG/100ML; MG/100ML; MG/100ML; MG/100ML
INJECTION, SOLUTION INTRAVENOUS ONCE
Status: COMPLETED | OUTPATIENT
Start: 2018-11-08 | End: 2018-11-08

## 2018-11-08 RX ORDER — GABAPENTIN 300 MG/1
300 CAPSULE ORAL ONCE
Status: DISCONTINUED | OUTPATIENT
Start: 2018-11-08 | End: 2018-11-08 | Stop reason: HOSPADM

## 2018-11-08 RX ORDER — BACITRACIN 50000 [IU]/1
INJECTION, POWDER, FOR SOLUTION INTRAMUSCULAR
Status: DISCONTINUED
Start: 2018-11-08 | End: 2018-11-08 | Stop reason: HOSPADM

## 2018-11-08 RX ORDER — CELECOXIB 200 MG/1
200 CAPSULE ORAL ONCE
Status: DISCONTINUED | OUTPATIENT
Start: 2018-11-08 | End: 2018-11-08 | Stop reason: HOSPADM

## 2018-11-08 RX ADMIN — SODIUM CHLORIDE, SODIUM LACTATE, POTASSIUM CHLORIDE, CALCIUM CHLORIDE: 600; 310; 30; 20 INJECTION, SOLUTION INTRAVENOUS at 09:48

## 2018-11-08 ASSESSMENT — PAIN SCALES - GENERAL
PAINLEVEL_OUTOF10: 0
PAINLEVEL_OUTOF10: 0

## 2018-11-08 NOTE — DISCHARGE INSTRUCTIONS
ACTIVITY: Rest and take it easy for the first 24 hours.  A responsible adult is recommended to remain with you during that time.  It is normal to feel sleepy.  We encourage you to not do anything that requires balance, judgment or coordination.    MILD FLU-LIKE SYMPTOMS ARE NORMAL. YOU MAY EXPERIENCE GENERALIZED MUSCLE ACHES, THROAT IRRITATION, HEADACHE AND/OR SOME NAUSEA.    FOR 24 HOURS DO NOT:  Drive, operate machinery or run household appliances.  Drink beer or alcoholic beverages.   Make important decisions or sign legal documents.    SPECIAL INSTRUCTIONS: ***    DIET: To avoid nausea, slowly advance diet as tolerated, avoiding spicy or greasy foods for the first day.  Add more substantial food to your diet according to your physician's instructions.  Babies can be fed formula or breast milk as soon as they are hungry.  INCREASE FLUIDS AND FIBER TO AVOID CONSTIPATION.    SURGICAL DRESSING/BATHING: *keep clean and dry **can shower lower half of body     FOLLOW-UP APPOINTMENT:  A follow-up appointment should be arranged with your doctor in ***; call to schedule. Keep as already scheduled     You should CALL YOUR PHYSICIAN if you develop:  Fever greater than 101 degrees F.  Pain not relieved by medication, or persistent nausea or vomiting.  Excessive bleeding (blood soaking through dressing) or unexpected drainage from the wound.  Extreme redness or swelling around the incision site, drainage of pus or foul smelling drainage.  Inability to urinate or empty your bladder within 8 hours.  Problems with breathing or chest pain.    You should call 911 if you develop problems with breathing or chest pain.  If you are unable to contact your doctor or surgical center, you should go to the nearest emergency room or urgent care center.  Physician's telephone #: *[phone]**    If any questions arise, call your doctor.  If your doctor is not available, please feel free to call the Surgical Center at [phone].  The  Center is open Monday through Friday from 7AM to 7PM.  You can also call the HEALTH HOTLINE open 24 hours/day, 7 days/week and speak to a nurse at [phone], or toll free at [phone].    A registered nurse may call you a few days after your surgery to see how you are doing after your procedure.    MEDICATIONS: Resume taking daily medication.  Take prescribed pain medication with food.  If no medication is prescribed, you may take non-aspirin pain medication if needed.  PAIN MEDICATION CAN BE VERY CONSTIPATING.  Take a stool softener or laxative such as senokot, pericolace, or milk of magnesia if needed.    Prescription given for *none**.  Last pain medication given at *________**.    If your physician has prescribed pain medication that includes Acetaminophen (Tylenol), do not take additional Acetaminophen (Tylenol) while taking the prescribed medication.    Depression / Suicide Risk    As you are discharged from this [facility] facility, it is important to learn how to keep safe from harming yourself.    Recognize the warning signs:  Abrupt changes in personality, positive or negative- including increase in energy   Giving away possessions  Change in eating patterns- significant weight changes-  positive or negative  Change in sleeping patterns- unable to sleep or sleeping all the time   Unwillingness or inability to communicate  Depression  Unusual sadness, discouragement and loneliness  Talk of wanting to die  Neglect of personal appearance   Rebelliousness- reckless behavior  Withdrawal from people/activities they love  Confusion- inability to concentrate     If you or a loved one observes any of these behaviors or has concerns about self-harm, here's what you can do:  Talk about it- your feelings and reasons for harming yourself  Remove any means that you might use to hurt yourself (examples: pills, rope, extension cords, firearm)  Get professional help from the community (Mental Health, Substance  Abuse, psychological counseling)  Do not be alone:Call your Safe Contact- someone whom you trust who will be there for you.  Call your local CRISIS HOTLINE 876-4267 or 920-256-8379  Call your local Children's Mobile Crisis Response Team [place] (686) 916-5727 or www.[employer]  Call the toll free National Suicide Prevention Hotlines   National Suicide Prevention Lifeline 711-334-IAKJ (3413)  National Mobile[employer]eda Line Network 800-SUICIDE (607-1344)

## 2018-11-08 NOTE — OP REPORT
DATE OF SERVICE:  11/08/2018    PREOPERATIVE DIAGNOSES:  1.  History of breast cancer.  2.  Asymmetry between reconstructed breasts.    POSTOPERATIVE DIAGNOSES:  1.  History of breast cancer.  2.  Asymmetry between reconstructed breasts.    PROCEDURES PERFORMED:  1.  Bilateral nipple areolar reconstruction using a modified skate flap   technique and a full thickness skin graft.  2.  Bilateral dermal grafts to increase nipple projection.  3.  Bilateral breast reconstruction with fat grafting.  4.  Bilateral revision of reconstructed breasts.  5.  Complex closure of abdominal wounds, 13 cm.    ATTENDING SURGEON:  German Shetty MD    ANESTHESIOLOGIST:  Refugio Noel MD    ASSISTANT:  EDUARDO Najera    ESTIMATED BLOOD LOSS:  Minimal.    COMPLICATIONS:  No apparent.    SPECIMENS:  None.    INDICATIONS FOR PROCEDURE:  The patient is a 63-year-old woman who previously   underwent bilateral mastectomies and reconstruction with tissue expander and   implants.  She does have asymmetry between the reconstructed breasts.  She   also desires to have a nipple areolar reconstruction.  She now presents for   the above operation.    INTRAOPERATIVE FINDINGS:  There was about 105 mL of fat grafted in each of the   reconstructed breasts for a total of 210 mL.    DESCRIPTION OF PROCEDURE:  After the operative and nonoperative options were   discussed and include was not limited to bleeding, infection, damage to   surrounding structures, need for further surgery, reaction to anesthetic   agent, scarring, breast asymmetry, contour irregularity, wound healing   difficulties, need for revisional surgery, skin graft failure, nipple   reconstruction failure, asymmetric fat survival, deep venous thrombosis,   pulmonary embolus, myocardial infarction, stroke, unsatisfactory result and/or   death, informed consent was obtained.    The patient was identified.  In a sitting upright position, the patient's   sternal notch, midline ad  the inframammary folds were marked.  The planned   location of the reconstructed nipples were marked out on the most projecting   portion of the breast mound.  The planned area for revision was marked out   laterally on the breast.  Areas for fat graft harvest were marked in the flank   and lateral thigh region.  Areas for a full thickness skin graft harvest were   marked just above the pubic hairline in a horizontal elliptical fashion.    Antibiotics given, sequential compression devices placed.  The patient was   brought to the operating suite where general anesthesia was induced.  Her   chest and abdomen was then prepped and draped in the usual sterile fashion.    Starting on the right hand side, a modified skate flap was drawn out with a 38   mm areolar sizer.  The superior and inferior portions were deepithelialized.    The limbs of the modified skate flap were then elevated and raised.  These   were secured with interrupted 5-0 fast-absorbing suture.  I then went down to   the abdomen where a dermal graft was harvested.  Tissue was deepithelialized   with a 15 blade.  Small amount of dermis was then harvested.  This dermis was   then taken up, defatted and placed into the nipple to increase projection.    This was secured with interrupted 5-0 fast-absorbing suture.  I then went down   to the abdomen where a 38 mm areolar sizer was used to danyelle out the nipple   areolar complex.  A full thickness skin graft was then harvested in this   location.  It was harvested at the level of the reticular dermis.  An opening   was made in the center aspect.  The nipple was then secured with interrupted   4-0 silk sutures in the periphery and then a running 5-0 fast absorbing suture   around the periphery and at the base of the nipple.  Following this then a   Xeroform bolster nipple guard and a dressing was placed.    We then turned laterally along the breast.  A 15 blade was used to make the   incision.  Varghese  electrocautery was used to elevate and mobilize the tissue.    The standing cutaneous deformities were excised.  The tissue was then   undermined both superiorly and inferiorly.  This was done to create a nice   flat smooth contour.  The advancement flaps were then brought together and the   tissue was then closed with 2-0 Vicryl sutures in the deep tissue, 3-0 deep   dermal Monocryl sutures and running 4-0 Monocryl subcuticular stitch to close   the overlying skin.    We then turned our attention to the left breast where the same procedure was   performed.    We then turned our attention to the flank and lateral thigh.  Tumescent   solution was then placed in these locations.  Adequate time was allowed for   the hemostatic effects of epinephrine to take effect.  Fat was then harvested   from this location using Benbria fat harvesting system.  The fat was then   irrigated 3 times per protocol.  The fat was then loaded 20 mL syringes.  Poke   incisions were made medially and superiorly on the breasts bilaterally.  Fat   was then grafted using a fanning technique.  Care was taken to ensure the   equal of fat were placed with each passage of the cannula.  The poke incisions   both on the breast and abdomen were then closed with interrupted 5-0   fast-absorbing suture.    An elliptical incision was made around the skin graft harvest sites in the   lower abdomen.  Bovie electrocautery was then used to elevate and remove some   of this tissue down below Ban's fascia.  After this was then done, a   subscarpal plane was then elevated superiorly up to the level of umbilicus.    This was done to create an advancement flap.  The redundant tissue was removed   bilaterally.  The flap was then brought down inferiorly and Ban's fascia   was then closed with 2-0 Vicryl sutures.  The dermis was closed with 3-0 deep   dermal Monocryl sutures and running 4-0 Monocryl subcuticular stitch was used   to close the overlying skin.   Local anesthetic was then placed in this site.    The patient was then washed.  Steri-Strips and compressive dressing was then   placed.  The patient was then awakened, extubated and transferred to the PACU   in stable condition.  At the end of procedure, all sponge, instrument and   needle counts were correct.       ____________________________________     MD KHLOE JANE / ARTEMIO    DD:  11/08/2018 13:16:49  DT:  11/08/2018 13:35:19    D#:  [id number]  Job#:  [id number]

## 2018-11-08 NOTE — OR SURGEON
Immediate Post OP Note    PreOp Diagnosis: history of breast cancer    PostOp Diagnosis: same    Procedure(s):  NIPPLE RECONSTRUCTION - Wound Class: Clean  FULL THICKNESS SKIN GRAFT - Wound Class: Clean  BREAST RECONSTRUCTION- FOR REVISION OF RECONSTRUCTED BREASTS AND FAT GRAFTING - Wound Class: Clean    Surgeon(s):  German Shetty M.D.    Anesthesiologist/Type of Anesthesia:  Anesthesiologist: Refugio Noel M.D./General    Surgical Staff:  Circulator: Zakia Allen R.N.  Scrub Person: Isael Tripp    Specimens removed if any:  * No specimens in log *    Estimated Blood Loss: minimal    Findings: see operative note    Complications: no apparent    #      11/8/2018 11:30 AM German Shetty M.D.

## 2018-11-08 NOTE — OR NURSING
1314 Received from OR, report from Dr. Noel,  Chest dressing CDI.    1330- without nausea ginger ale given   1345- c/o stinging pain only declines need for pain  meds at this time   1420-spouse at bedside

## 2018-11-08 NOTE — OR SURGEON
Immediate Post OP Note    PreOp Diagnosis: history of breast cancer    PostOp Diagnosis: same    Procedure(s):  NIPPLE RECONSTRUCTION - Wound Class: Clean  FULL THICKNESS SKIN GRAFT - Wound Class: Clean  BREAST RECONSTRUCTION- FOR REVISION OF RECONSTRUCTED BREASTS AND FAT GRAFTING - Wound Class: Clean    Surgeon(s):  German Shetty M.D.    Anesthesiologist/Type of Anesthesia:  Anesthesiologist: Refugio Noel M.D./General    Surgical Staff:  Circulator: Zakia Allen R.N.  Relief Circulator: Juju Walker R.N.  Scrub Person: Isael Tripp    Specimens removed if any:  * No specimens in log *    Estimated Blood Loss: minimal    Findings: see operative note    Complications: no apparent    #[id number]    11/8/2018 1:11 PM German Shetty M.D.

## 2019-05-31 ENCOUNTER — HOSPITAL ENCOUNTER (OUTPATIENT)
Dept: RADIOLOGY | Facility: MEDICAL CENTER | Age: 64
End: 2019-05-31
Attending: INTERNAL MEDICINE
Payer: COMMERCIAL

## 2019-05-31 ENCOUNTER — HOSPITAL ENCOUNTER (OUTPATIENT)
Dept: LAB | Facility: MEDICAL CENTER | Age: 64
End: 2019-05-31
Attending: INTERNAL MEDICINE
Payer: COMMERCIAL

## 2019-05-31 DIAGNOSIS — C50.011 PAGET'S DISEASE OF THE BREAST, RIGHT (HCC): ICD-10-CM

## 2019-05-31 LAB
ALBUMIN SERPL BCP-MCNC: 4.5 G/DL (ref 3.2–4.9)
ALBUMIN/GLOB SERPL: 1.8 G/DL
ALP SERPL-CCNC: 60 U/L (ref 30–99)
ALT SERPL-CCNC: 12 U/L (ref 2–50)
ANION GAP SERPL CALC-SCNC: 11 MMOL/L (ref 0–11.9)
AST SERPL-CCNC: 16 U/L (ref 12–45)
BILIRUB SERPL-MCNC: 0.4 MG/DL (ref 0.1–1.5)
BUN SERPL-MCNC: 16 MG/DL (ref 8–22)
CALCIUM SERPL-MCNC: 9.5 MG/DL (ref 8.5–10.5)
CHLORIDE SERPL-SCNC: 107 MMOL/L (ref 96–112)
CO2 SERPL-SCNC: 24 MMOL/L (ref 20–33)
CREAT SERPL-MCNC: 0.81 MG/DL (ref 0.5–1.4)
GLOBULIN SER CALC-MCNC: 2.5 G/DL (ref 1.9–3.5)
GLUCOSE SERPL-MCNC: 105 MG/DL (ref 65–99)
POTASSIUM SERPL-SCNC: 3.8 MMOL/L (ref 3.6–5.5)
PROT SERPL-MCNC: 7 G/DL (ref 6–8.2)
SODIUM SERPL-SCNC: 142 MMOL/L (ref 135–145)

## 2019-05-31 PROCEDURE — 36415 COLL VENOUS BLD VENIPUNCTURE: CPT

## 2019-05-31 PROCEDURE — 80053 COMPREHEN METABOLIC PANEL: CPT

## 2019-05-31 PROCEDURE — 71260 CT THORAX DX C+: CPT

## 2019-05-31 PROCEDURE — 700117 HCHG RX CONTRAST REV CODE 255: Performed by: INTERNAL MEDICINE

## 2019-05-31 PROCEDURE — A9503 TC99M MEDRONATE: HCPCS

## 2019-05-31 RX ADMIN — IOHEXOL 75 ML: 350 INJECTION, SOLUTION INTRAVENOUS at 14:15

## 2020-01-15 ENCOUNTER — HOSPITAL ENCOUNTER (OUTPATIENT)
Dept: RADIOLOGY | Facility: MEDICAL CENTER | Age: 65
End: 2020-01-15
Attending: INTERNAL MEDICINE
Payer: COMMERCIAL

## 2020-01-15 DIAGNOSIS — C50.411 MALIGNANT NEOPLASM OF UPPER-OUTER QUADRANT OF RIGHT FEMALE BREAST, UNSPECIFIED ESTROGEN RECEPTOR STATUS (HCC): ICD-10-CM

## 2020-01-15 PROCEDURE — 700117 HCHG RX CONTRAST REV CODE 255: Performed by: INTERNAL MEDICINE

## 2020-01-15 PROCEDURE — 71260 CT THORAX DX C+: CPT

## 2020-01-15 RX ADMIN — IOHEXOL 75 ML: 350 INJECTION, SOLUTION INTRAVENOUS at 11:15

## 2024-04-14 ENCOUNTER — HOSPITAL ENCOUNTER (OUTPATIENT)
Dept: RADIOLOGY | Facility: MEDICAL CENTER | Age: 69
End: 2024-04-14
Attending: FAMILY MEDICINE
Payer: COMMERCIAL

## 2024-04-14 DIAGNOSIS — I88.9 NONSPECIFIC LYMPHADENITIS, UNSPECIFIED: ICD-10-CM

## 2024-04-14 PROCEDURE — 72148 MRI LUMBAR SPINE W/O DYE: CPT

## (undated) DEVICE — SUTURE 5-0 PLAIN GUT PC-1 - (12/BX)

## (undated) DEVICE — GLOVE BIOGEL SZ 8 SURGICAL PF LTX - (50PR/BX 4BX/CA)

## (undated) DEVICE — CANISTER SUCTION RIGID RED 1500CC (40EA/CA)

## (undated) DEVICE — WATER IRRIGATION STERILE 1000ML (12EA/CA)

## (undated) DEVICE — STAPLER SKIN DISP - (6/BX 10BX/CA) VISISTAT

## (undated) DEVICE — BOVIE NEEDLE TIP INSULATD NON-SAFETY 2CM (50/PK)

## (undated) DEVICE — SUCTION INSTRUMENT YANKAUER BULBOUS TIP W/O VENT (50EA/CA)

## (undated) DEVICE — KIT  I.V. START (100EA/CA)

## (undated) DEVICE — SET EXTENSION WITH 2 PORTS (48EA/CA) ***PART #2C8610 IS A SUBSTITUTE*****

## (undated) DEVICE — STERI STRIP COMPOUND BENZOIN - TINCTURE 0.6ML WITH APPLICATOR (40EA/BX)

## (undated) DEVICE — SUTURE 4-0 ETHILON FS-2 18 (36PK/BX)"

## (undated) DEVICE — GLOVE SZ 6.5 BIOGEL PI MICRO - PF LF (50PR/BX)

## (undated) DEVICE — TUBING CLEARLINK DUO-VENT - C-FLO (48EA/CA)

## (undated) DEVICE — SPANDAGE CHEST SZ10 25YDS - STRETCH (21 EA/CA 1RL/CA)

## (undated) DEVICE — BANDAGE ELASTIC STERILE MATRIX 6 X 10 (20EA/CA)

## (undated) DEVICE — BLADE SURGICAL #15 - (50/BX 3BX/CA)

## (undated) DEVICE — ELECTRODE 850 FOAM ADHESIVE - HYDROGEL RADIOTRNSPRNT (50/PK)

## (undated) DEVICE — PROTECTOR ULNA NERVE - (36PR/CA)

## (undated) DEVICE — SUTURE 2-0 VICRYL PLUS CT-1 - 8 X 18 INCH(12/BX)

## (undated) DEVICE — SET LEADWIRE 5 LEAD BEDSIDE DISPOSABLE ECG (1SET OF 5/EA)

## (undated) DEVICE — COVER PROBE STERILE CONE (12EA/CA)

## (undated) DEVICE — GOWN WARMING STANDARD FLEX - (30/CA)

## (undated) DEVICE — SET MULTI-ADD FLUID TRANSFER 42 INCH - (50/CA)THIS IS A CUSTOM MADE ITEM 4 TO 6 WEEK LEAD TIME

## (undated) DEVICE — DRAIN J-VAC 7MM FLAT - (10EA/CA)

## (undated) DEVICE — Device

## (undated) DEVICE — SPONGE DRAIN 4 X 4IN 6-PLY - (2/PK25PK/BX12BX/CS)

## (undated) DEVICE — BLADE SURGICAL #10 - (50/BX)

## (undated) DEVICE — SPONGE GAUZESTER. 2X2 4-PL - (2/PK 50PK/BX 30BX/CS)

## (undated) DEVICE — SUTURE 3-0 ETHILON PS-1 (36PK/BX)

## (undated) DEVICE — PACK MINOR BASIN - (2EA/CA)

## (undated) DEVICE — SHEET TRANSVERSE LAP - (12EA/CA)

## (undated) DEVICE — NEPTUNE 4 PORT MANIFOLD - (20/PK)

## (undated) DEVICE — DRESSING TRANSPARENT FILM TEGADERM 4 X 4.75" (50EA/BX)"

## (undated) DEVICE — CANISTER SUCTION 3000ML MECHANICAL FILTER AUTO SHUTOFF MEDI-VAC NONSTERILE LF DISP  (40EA/CA)

## (undated) DEVICE — HEAD HOLDER JUNIOR/ADULT

## (undated) DEVICE — DRESSING ABDOMINAL PAD STERILE 8 X 10" (360EA/CA)"

## (undated) DEVICE — GOWN SURGEONS X-LARGE - DISP. (30/CA)

## (undated) DEVICE — MASK ANESTHESIA ADULT  - (100/CA)

## (undated) DEVICE — RESERVOIR SUCTION 100 CC - SILICONE (20EA/CA)

## (undated) DEVICE — GLOVE BIOGEL SZ 6.5 SURGICAL PF LTX (50PR/BX 4BX/CA)

## (undated) DEVICE — DRESSING PETROLEUM GAUZE 5 X 9" (50EA/BX 4BX/CA)"

## (undated) DEVICE — SYRINGE SAFETY 10 ML 18 GA X 1 1/2 BLUNT LL (100/BX 4BX/CA)

## (undated) DEVICE — SPONGE GAUZE NON-STERILE 4X4 - (2000/CA 10PK/CA)

## (undated) DEVICE — LACTATED RINGERS INJ 1000 ML - (14EA/CA 60CA/PF)

## (undated) DEVICE — SYRINGE AUTOFILL KIT - 10/BX

## (undated) DEVICE — SUTURE 4-0 MONOCRYL PLUS PS-2 - 27 INCH (36/BX)

## (undated) DEVICE — NEEDLE NON SAFETY 25 GA X 1 1/2 IN HYPO (100EA/BX)

## (undated) DEVICE — SUTURE 3-0 VICRYL PLUS SH - 8X 18 INCH (12/BX)

## (undated) DEVICE — CONTAINER, SPECIMEN, STERILE

## (undated) DEVICE — APPLIER OPEN MEDIUM CLIP (6EA/BX)

## (undated) DEVICE — GLOVE BIOGEL PI INDICATOR SZ 7.0 SURGICAL PF LF - (50/BX 4BX/CA)

## (undated) DEVICE — KIT ANESTHESIA W/CIRCUIT & 3/LT BAG W/FILTER (20EA/CA)

## (undated) DEVICE — TUBE E-T HI-LO CUFF 7.0MM (10EA/PK)

## (undated) DEVICE — NEEDLE SAFETY 18 GA X 1 1/2 IN (100EA/BX)

## (undated) DEVICE — SUTURE 4-0 SILK SH C/R 8 X 18 (12EA/BX)"

## (undated) DEVICE — BOVIE BLADE COATED &INSULATED - 25/PK

## (undated) DEVICE — SODIUM CHL IRRIGATION 0.9% 1000ML (12EA/CA)

## (undated) DEVICE — GLOVE BIOGEL INDICATOR SZ 8.5 SURGICAL PF LTX - (50/BX 4BX/CA)

## (undated) DEVICE — CHLORAPREP 26 ML APPLICATOR - ORANGE TINT(25/CA)

## (undated) DEVICE — SUTURE GENERAL

## (undated) DEVICE — SUTURE 2-0 PDS II CT-2 - (36/BX)

## (undated) DEVICE — LIGASURE SM JAW SEALER CRVD - (6EA/CA)

## (undated) DEVICE — GLOVE BIOGEL INDICATOR SZ 6.5 SURGICAL PF LTX - (50PR/BX 4BX/CA)

## (undated) DEVICE — SUTURE 3-0 MONOCRYL PLUS PS-1 - 27 INCH (36/BX)

## (undated) DEVICE — SUTURE 2-0 VICRYL PLUS SH - 8 X 18 INCH (12/BX)

## (undated) DEVICE — DRAPE LARGE 3 QUARTER - (20/CA)

## (undated) DEVICE — PACK MAJOR BASIN - (2EA/CA)

## (undated) DEVICE — MEDICINE CUP STERILE 2 OZ - (100/CA)

## (undated) DEVICE — ELECTRODE DUAL RETURN W/ CORD - (50/PK)

## (undated) DEVICE — TUBE CONNECTING SUCTION - CLEAR PLASTIC STERILE 72 IN (50EA/CA)

## (undated) DEVICE — KIT ROOM DECONTAMINATION

## (undated) DEVICE — SPONGE GAUZE STER 4X4 8-PL - (2/PK 50PK/BX 12BX/CS)

## (undated) DEVICE — MASK AIRWAY SIZE 3 UNIQUE SILICON (10/BX)

## (undated) DEVICE — DEVICE MONOPOLAR RF PEAK PLASMABLADE 3.0S

## (undated) DEVICE — GAUZE FLUFF STERILE 2-PLY 36 X 36 (100EA/CA)

## (undated) DEVICE — SUTURE 3-0 MONOCRYL PLUS PS-2 - (12/BX)

## (undated) DEVICE — SUTURE 4-0 VICRYL PLUS FS-2 - 27 INCH (36/BX)

## (undated) DEVICE — DRAPE CHEST/BREAST - (12EA/CA)

## (undated) DEVICE — GLOVE LITE HANDLE DISP. - (1/PK 80PK/CS)

## (undated) DEVICE — SENSOR SPO2 NEO LNCS ADHESIVE (20/BX) SEE USER NOTES

## (undated) DEVICE — SPANDAGE SZ 6 ELASTIC NET - (25YD/CA)

## (undated) DEVICE — SYRINGE 50ML CATHETER TIP (40EA/BX 4BX/CA)

## (undated) DEVICE — SYRINGE 30 ML LL (56/BX)

## (undated) DEVICE — MANIFOLD NEPTUNE 1 PORT (20/PK)

## (undated) DEVICE — SYRINGE 10 ML CONTROL LL (25EA/BX 4BX/CA)

## (undated) DEVICE — BANDAGE ELASTIC 6 HONEYCOMB - 6X5YD LF (20/CA)"

## (undated) DEVICE — SPONGE GAUZESTER 4 X 4 4PLY - (128PK/CA)

## (undated) DEVICE — GRAFTING FAT REVOLVE (1EA/PK)

## (undated) DEVICE — GLOVE BIOGEL INDICATOR SZ 7.5 SURGICAL PF LTX - (50PR/BX 4BX/CA)

## (undated) DEVICE — DRAPE IOBAN II INCISE 23X17 - (10EA/BX 4BX/CA)

## (undated) DEVICE — CATHETER IV 20 GA X 1-1/4 ---SURG.& SDS ONLY--- (50EA/BX)

## (undated) DEVICE — DRAPE SURGICAL U 77X120 - (10/CA)

## (undated) DEVICE — BOVIE BLADE COATED &INSULATED (50EA/PK)

## (undated) DEVICE — SET BUTTERFLY VACUTAINER SAFETY 21GA DELETED ITEM ORDER 14034

## (undated) DEVICE — SYRINGE SAFETY TB 1 ML 27 GA X 1/2 IN (100/BX 4BX/CA)

## (undated) DEVICE — GLOVE BIOGEL SZ 7 SURGICAL PF LTX - (50PR/BX 4BX/CA)

## (undated) DEVICE — CLOSURE SKIN STRIP 1/2 X 4 IN - (STERI STRIP) (50/BX 4BX/CA)

## (undated) DEVICE — PACK LOWER EXTREMITY - (2/CA)

## (undated) DEVICE — SUTURE 3-0 ETHILON FS-1 - (36/BX) 30 INCH

## (undated) DEVICE — DRESSING TRANSPARENT FILM TEGADERM 2.375 X 2.75"  (100EA/BX)"

## (undated) DEVICE — GLOVE, BIOGEL ECLIPSE, SZ 7.0, PF LTX (50/BX)

## (undated) DEVICE — GOWN SURGICAL XX-LARGE - (28EA/CA) SIRUS NON REINFORCED

## (undated) DEVICE — SUTURE 4-0 MONOCRYL PLUS PS-1 - 27 INCH (36/BX)

## (undated) DEVICE — SPLINT ORTH 15FTX3IN PD STKNT - (ORTHO-GLASS) OG-3L2 & OG-3L1 IS THE SAME PRODUCT.

## (undated) DEVICE — BALL COTTON STERILE 5/PK - (5/PK 25PK/CA)

## (undated) DEVICE — SLEEVE, VASO, THIGH, MED

## (undated) DEVICE — SOD. CHL. INJ. 0.9% 1000 ML - (14EA/CA 60CA/PF)